# Patient Record
Sex: FEMALE | Race: WHITE | NOT HISPANIC OR LATINO | Employment: FULL TIME | URBAN - METROPOLITAN AREA
[De-identification: names, ages, dates, MRNs, and addresses within clinical notes are randomized per-mention and may not be internally consistent; named-entity substitution may affect disease eponyms.]

---

## 2017-03-07 ENCOUNTER — ALLSCRIPTS OFFICE VISIT (OUTPATIENT)
Dept: OTHER | Facility: OTHER | Age: 22
End: 2017-03-07

## 2017-03-17 ENCOUNTER — GENERIC CONVERSION - ENCOUNTER (OUTPATIENT)
Dept: OTHER | Facility: OTHER | Age: 22
End: 2017-03-17

## 2017-03-17 LAB — COMMENTS: (HISTORICAL): NORMAL

## 2017-05-30 ENCOUNTER — ALLSCRIPTS OFFICE VISIT (OUTPATIENT)
Dept: OTHER | Facility: OTHER | Age: 22
End: 2017-05-30

## 2017-06-01 ENCOUNTER — ALLSCRIPTS OFFICE VISIT (OUTPATIENT)
Dept: OTHER | Facility: OTHER | Age: 22
End: 2017-06-01

## 2017-11-17 ENCOUNTER — GENERIC CONVERSION - ENCOUNTER (OUTPATIENT)
Dept: OTHER | Facility: OTHER | Age: 22
End: 2017-11-17

## 2017-12-12 ENCOUNTER — ALLSCRIPTS OFFICE VISIT (OUTPATIENT)
Dept: OTHER | Facility: OTHER | Age: 22
End: 2017-12-12

## 2017-12-13 NOTE — PROGRESS NOTES
Plan  Contraception    · TriNessa (28) 0 18/0 215/0 25 MG-35 MCG Oral Tablet; Take 1 tablet daily   Rx By: Mehrdad Thibodeaux; Dispense: 0 Days ; #:84 Tablet; Refill: 3;Contraception; BILLY = Y; Sent To: [facility]    Discussion/Summary    Patient of a normal gyn exam  A refill of her birth control was sent to her mail order  She will follow up in one year or sooner with any concerns  The patient has the current Goals: To continue a health life style  The patent has the current Barriers: No barriers  Patient is able to Self-Care  Self Referrals: Yes      Chief Complaint  yearly      History of Present Illness  HPI: This is a 20year old white female, nulliparous  She is currently in a long-term monogamous relationship  Her current method of birth control is oral contraceptive  States her cycles are normal lasting about 4-5 days  She denies any  or GI complaints  She denies any change to her medical or family history  She is currently working for [employer]  She denies any symptoms of anxiety or depression  GYN , Adult Female [facility]: The patient is being seen for a gynecology evaluation  The last health maintenance visit was 1 year(s) ago  General Health: The patient's health since the last visit is described as good  She has regular dental visits  -- She denies vision problems  -- She denies hearing loss  Lifestyle:  She does not have a healthy diet  -- She does not have any weight concerns  -- She exercises regularly  -- She does not use tobacco -- She denies alcohol use  -- She denies drug use  Screening:      Review of Systems   Constitutional: No fever, no chills, feels well, no tiredness, no recent weight gain or loss  ENT: no ear ache, no loss of hearing, no nosebleeds or nasal discharge, no sore throat or hoarseness  Cardiovascular: no complaints of slow or fast heart rate, no chest pain, no palpitations, no leg claudication or lower extremity edema    Respiratory: no complaints of shortness of breath, no wheezing, no dyspnea on exertion, no orthopnea or PND  Breasts: no complaints of breast pain, breast lump or nipple discharge  Gastrointestinal: no complaints of abdominal pain, no constipation, no nausea or diarrhea, no vomiting, no bloody stools  Genitourinary: no complaints of dysuria, no incontinence, no pelvic pain, no dysmenorrhea, no vaginal discharge or abnormal vaginal bleeding  Musculoskeletal: no complaints of arthralgia, no myalgia, no joint swelling or stiffness, no limb pain or swelling  Integumentary: no complaints of skin rash or lesion, no itching or dry skin, no skin wounds  Neurological: no complaints of headache, no confusion, no numbness or tingling, no dizziness or fainting  Over the past 2 weeks, how often have you been bothered by the following problems? 1 ) Little interest or pleasure in doing things? Not at all   2 ) Feeling down, depressed or hopeless? Not at all   3 ) Trouble falling asleep or sleeping too much? Not at all   4 ) Feeling tired or having little energy? Not at all   5 ) Poor appetite or overeating? Not at all   6 ) Feeling bad about yourself, or that you are a failure, or have let yourself or your family down? Not at all   7 ) Trouble concentrating on things, such as reading a newspaper or watching television? Not at all   8 ) Moving or speaking so slowly that other people could have noticed, or the opposite, moving or speaking faster than usual? Not at all  How difficult have these problems made it for you to do your work, take care of things at home, or get along with people? 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)   6  Screening for tuberculosis (V74 1) (Z11 1)    Past Medical History   · History of Acute serous otitis media, unspecified laterality   · History of Acute upper respiratory infection (465 9) (J06 9)   · History of acute pharyngitis (V12 69) (Z87 09)   · History of headache (V13 89) (S47 658)   · History of herpes simplex infection (V12 09) (Z86 19)   · History of Lumbar Sprain (847 2)   · History of Plantar warts (078 12) (B07 0)   · History of Rhinosinusitis (473 9) (J32 9)   · History of Sore throat (462) (J02 9)   · History of Tinea versicolor (111 0) (B36 0)   · History of Tinea versicolor (111 0) (B36 0)   · History of Tonsillopharyngitis (465 8) (J03 90,J02  9)   · History of Ureteric stone (592 1) (N20 1)   · History of Vaginal candidiasis (112 1) (B37 3)    Surgical History   · History of Rhinoplasty   · History of Tonsillectomy With Adenoidectomy    Family History  Father    · Family history of Post-angioplasty  Maternal Grandfather    · Family history of Prostate Cancer (V16 42)  Paternal Grandfather    · Family history of Acute Myocardial Infarction (V17 3)  Family History    · Family history of Diabetes Mellitus (V18 0)   · Family history of Epilepsy And Recurrent Seizures (V17 2)    Social History     · Never A Smoker    Current Meds   1  TriNessa (28) 0 18/0 215/0 25 MG-35 MCG Oral Tablet; Take 1 tablet daily; Therapy: 77Hpg3661 to (Last Rx:30Jan2017)  Requested for: 30Jan2017 Ordered   2  ValACYclovir HCl - 1 GM Oral Tablet; take 2 tab  BID with outbreak; Therapy: 49RHG4433 to (Last Dejon Jarrett)  Requested for: 44Yjf1632 Ordered    Allergies  1  No Known Drug Allergies    Vitals   Recorded: 05JNR1360 39:91PC   Systolic 183   Diastolic 70   Height 5 ft 3 5 in   Weight 169 lb    BMI Calculated 29 47   BSA Calculated 1 81   LMP [id number]       Physical Exam   Constitutional  General appearance: No acute distress, well appearing and well nourished  Neck  Neck: Normal, supple, trachea midline, no masses  Thyroid: Normal, no thyromegaly  Pulmonary  Respiratory effort: No increased work of breathing or signs of respiratory distress  Auscultation of lungs: Clear to auscultation  Cardiovascular  Auscultation of heart: Normal rate and rhythm, normal S1 and S2, no murmurs  Peripheral vascular exam: Normal pulses Throughout  Genitourinary  External genitalia: Normal and no lesions appreciated  Vagina: Normal, no lesions or dryness appreciated  Urethra: Normal    Urethral meatus: Normal    Bladder: Normal, soft, non-tender and no prolapse or masses appreciated  Cervix: Normal, no palpable masses  Uterus: Normal, non-tender, not enlarged, and no palpable masses  Adnexa/parametria: Normal, non-tender and no fullness or masses appreciated  Chest  Breasts: Normal and no dimpling or skin changes noted  Abdomen  Abdomen: Normal, non-tender, and no organomegaly noted  Liver and spleen: No hepatomegaly or splenomegaly  Examination for hernias: No hernias appreciated  Lymphatic  Palpation of lymph nodes in neck, axillae, groin and/or other locations: No lymphadenopathy or masses noted  Skin  Skin and subcutaneous tissue: Normal skin turgor and no rashes     Palpation of skin and subcutaneous tissue: Normal    Psychiatric  Orientation to person, place, and time: Normal    Mood and affect: Normal        Signatures   Electronically signed by : Car Matamoros; Dec 12 2017  1:41PM EST                       (Author)

## 2017-12-31 ENCOUNTER — GENERIC CONVERSION - ENCOUNTER (OUTPATIENT)
Dept: OTHER | Facility: OTHER | Age: 22
End: 2017-12-31

## 2018-01-12 NOTE — PROGRESS NOTES
Assessment    1  Encounter for annual routine gynecological examination (V72 31) (Z01 419)   2  Screening for tuberculosis (V74 1) (Z11 1)    Plan  Screening for tuberculosis    · PPD    Discussion/Summary  health maintenance visit Currently, she eats a poor diet and has an inadequate exercise regimen  the risks and benefits of cervical cancer screening were discussed cervical cancer screening is current cervical cancer screening is managed by GYN, Dr Mario Jasso Breast cancer screening: the risks and benefits of breast cancer screening were discussed, self breast exam technique was taught and monthly self breast exam was advised  Colorectal cancer screening: the risks and benefits of colorectal cancer screening were discussed  Osteoporosis screening: bone mineral density testing is not indicated  The risks and benefits of immunizations were discussed and immunizations are up to date  Advice and education were given regarding nutrition, aerobic exercise, weight bearing exercise, weight loss, reproductive health, contraception, sunscreen use, self skin examination and seat belt use  Work PE forms completed today  PPD administered- will need to be read in 48-72 hours  Will be due for Tdap 12/2017  Encouraged regular exercise, weight loss  Declined routine labs- her insurance will be changing with her new job shortly  RTO as scheduled  Possible side effects of new medications were reviewed with the patient/guardian today  The treatment plan was reviewed with the patient/guardian  The patient/guardian understands and agrees with the treatment plan      Chief Complaint  Patient is here for an employment physical exam with papers  History of Present Illness  HM, Adult Female: The patient is being seen for a health maintenance evaluation  The last health maintenance visit was unsure of last PE  Social History: Household members include mother and Brother  She is unmarried   Work status: working part-time and occupation: [employer]  The patient has never smoked cigarettes  She reports occasional alcohol use and drinking 2-3 drinks per month  She has never used illicit drugs  General Health: The patient's health since the last visit is described as good  She has regular dental visits  The patient reports her last dental visit was 6 months ago  Dental problems: no tooth pain and no caries  She complains of vision problems  Vision care includes wearing soft contact lenses and an eye examination within the last year  She denies hearing loss  Immunizations status: up to date   due for Tdap 12/2017  Lifestyle:  She consumes a diverse and healthy diet  She has weight concerns  Weight control issues: overweight  She does not exercise regularly  She does not use tobacco  She consumes alcohol  She denies drug use  Reproductive health:  she reports normal menses  she uses contraception  For contraception, she uses oral contraception pills  she is sexually active  She is monogamous with a male partner  she denies prior pregnancies  Screening: Cervical cancer screening includes a pap smear performed 3/2017  Breast cancer screening includes no previous mammogram, a clinical breast exam performed 3/2017 and monthly breast self-exams performed  She hasn't been previously screened for colorectal cancer  Metabolic screening includes no previous lipid profile, uncertain timing of her last glucose screening, uncertain timing of her last thyroid function test and no previous DEXA  Cardiovascular risk factors: sedentary lifestyle and family history of cardiovascular disease, but no hypertension, no diabetes, no high LDL cholesterol, no low HDL cholesterol, no stress, no obesity, no tobacco use and no illicit drug use  General health risks: no abnormal cervical cytology and no positive screening for human papilloma virus  Safety elements used: seat belt, sunscreen and smoke detector     HPI: Pt presents by herself today for a routine PE  She will be starting work at [employer] in the upcoming weeks  No acute complaints today, feels well  Review of Systems    Constitutional: no fever, not feeling poorly, no chills and not feeling tired  ENT: no earache, no sore throat, no nasal discharge and no hoarseness  Cardiovascular: no chest pain, no intermittent leg claudication, no palpitations and no lower extremity edema  Respiratory: no shortness of breath, no cough, no wheezing and no shortness of breath during exertion  Gastrointestinal: no abdominal pain, no nausea, no constipation, no diarrhea and no blood in stools  Genitourinary: no dysuria, no incontinence and no unexplained vaginal bleeding  Musculoskeletal: no arthralgias and no myalgias  Integumentary: no rashes and no skin wound  Neurological: no headache and no dizziness  Psychiatric: no anxiety and no depression  no feelings of weakness   Hematologic/Lymphatic: no swollen glands, no tendency for easy bleeding and no tendency for easy bruising  ROS reviewed  Active Problems    1  Encounter for annual routine gynecological examination (V72 31) (Z01 419)   2  Herpes simplex type 1 infection (054 9) (B00 9)   3  Nasal septal deviation (470) (J34 2)   4  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)    Past Medical History    · History of Acute serous otitis media, unspecified laterality   · History of Acute upper respiratory infection (465 9) (J06 9)   · History of acute pharyngitis (V12 69) (Z87 09)   · History of headache (V13 89) (U18 138)   · History of herpes simplex infection (V12 09) (Z86 19)   · History of Lumbar Sprain (847 2)   · History of Plantar warts (078 12) (B07 0)   · History of Rhinosinusitis (473 9) (J32 9)   · History of Sore throat (462) (J02 9)   · History of Tinea versicolor (111 0) (B36 0)   · History of Tinea versicolor (111 0) (B36 0)   · History of Tonsillopharyngitis (465 8) (J03 90,J02  9)   · History of Ureteric stone (592 1) (N20 1)   · History of Vaginal candidiasis (112 1) (B37 3)    Surgical History    · History of Rhinoplasty   · History of Tonsillectomy With Adenoidectomy    Family History  Father    · Family history of Post-angioplasty  Maternal Grandfather    · Family history of Prostate Cancer (V16 42)  Paternal Grandfather    · Family history of Acute Myocardial Infarction (V17 3)  Family History    · Family history of Diabetes Mellitus (V18 0)   · Family history of Epilepsy And Recurrent Seizures (V17 2)    Social History    · Never A Smoker    Current Meds   1  TriNessa (28) 0 18/0 215/0 25 MG-35 MCG Oral Tablet; Take 1 tablet daily; Therapy: 46Jzm7401 to (Last Rx:30Jan2017)  Requested for: 30Jan2017 Ordered   2  ValACYclovir HCl - 1 GM Oral Tablet; take 2 tab  BID with outbreak; Therapy: 21TWF9297 to (Last Rx:07Mar2017)  Requested for: 92MSF1317 Ordered    Allergies    1  No Known Drug Allergies    Vitals   Recorded: 33REM7393 09:13AM   Temperature 97 9 F, Tympanic   Heart Rate 90, R Radial   Respiration 16   Systolic 822, LUE, Sitting   Diastolic 84, LUE, Sitting   Height 5 ft 3 5 in   Weight 164 lb 8 oz   BMI Calculated 28 68   BSA Calculated 1 79     Physical Exam    Constitutional   General appearance: No acute distress, well appearing and well nourished  overweight  Head and Face   Head and face: Normal     Palpation of the face and sinuses: No sinus tenderness  Eyes   Conjunctiva and lids: No swelling, erythema or discharge  Pupils and irises: Equal, round, reactive to light  Ears, Nose, Mouth, and Throat   External inspection of ears and nose: Normal     Otoscopic examination: Tympanic membranes translucent with normal light reflex  Canals patent without erythema  Hearing: Normal     Nasal mucosa, septum, and turbinates: Normal without edema or erythema  Lips, teeth, and gums: Normal, good dentition      Oropharynx: Normal with no erythema, edema, exudate or lesions  Neck   Neck: Supple, symmetric, trachea midline, no masses  Thyroid: Normal, no thyromegaly  Pulmonary   Respiratory effort: No increased work of breathing or signs of respiratory distress  Auscultation of lungs: Clear to auscultation  Cardiovascular   Auscultation of heart: Normal rate and rhythm, normal S1 and S2, no murmurs  Carotid pulses: 2+ bilaterally  Examination of extremities for edema and/or varicosities: Normal     Abdomen   Abdomen: Non-tender, no masses  The abdomen was rounded  Bowel sounds were normal  The abdomen was soft and nontender  Liver and spleen: No hepatomegaly or splenomegaly  Lymphatic   Palpation of lymph nodes in neck: No lymphadenopathy  Musculoskeletal   Gait and station: Normal     Digits and nails: Normal without clubbing or cyanosis  Joints, bones, and muscles: Normal     Skin   Skin and subcutaneous tissue: Normal without rashes or lesions  Palpation of skin and subcutaneous tissue: Normal turgor  Neurologic   Cranial nerves: Cranial nerves II-XII intact  Cortical function: Normal mental status  Psychiatric   Judgment and insight: Normal     Orientation to person, place, and time: Normal     Mood and affect: Normal        Attending Note  Collaborating Physician Note: Collaborating Physician: I did not interview and examine the patient and I agree with the Advanced Practitioner note  Future Appointments    Date/Time Provider Specialty Site   06/01/2017 10:00 AM Nurse Whit Childress  [facility]     Signatures   Electronically signed by : QUYEN Barney; May 30 2017  9:55AM EST                       (Author)    Electronically signed by :  Gabriela Coe MD; May 30 2017 12:00PM EST                       (Author)

## 2018-01-13 VITALS
HEIGHT: 64 IN | TEMPERATURE: 97.9 F | SYSTOLIC BLOOD PRESSURE: 116 MMHG | DIASTOLIC BLOOD PRESSURE: 84 MMHG | RESPIRATION RATE: 16 BRPM | WEIGHT: 164.5 LBS | HEART RATE: 90 BPM | BODY MASS INDEX: 28.09 KG/M2

## 2018-01-15 VITALS
HEIGHT: 64 IN | BODY MASS INDEX: 29.03 KG/M2 | HEART RATE: 88 BPM | DIASTOLIC BLOOD PRESSURE: 90 MMHG | SYSTOLIC BLOOD PRESSURE: 130 MMHG | TEMPERATURE: 98.8 F | WEIGHT: 170.03 LBS | RESPIRATION RATE: 16 BRPM

## 2018-01-16 NOTE — PROGRESS NOTES
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

## 2018-01-22 VITALS
DIASTOLIC BLOOD PRESSURE: 70 MMHG | RESPIRATION RATE: 12 BRPM | HEIGHT: 64 IN | HEART RATE: 72 BPM | WEIGHT: 169.4 LBS | SYSTOLIC BLOOD PRESSURE: 118 MMHG | BODY MASS INDEX: 28.92 KG/M2 | TEMPERATURE: 97.8 F

## 2018-01-23 VITALS
HEIGHT: 64 IN | SYSTOLIC BLOOD PRESSURE: 112 MMHG | WEIGHT: 169 LBS | DIASTOLIC BLOOD PRESSURE: 70 MMHG | BODY MASS INDEX: 28.85 KG/M2

## 2018-01-23 NOTE — MISCELLANEOUS
Message  Message Free Text Note Form: Pt called answering service for UTI symptoms  I tried to call pt back but nobody answer  I left message  Signatures   Electronically signed by :  Crispin Addison MD; Dec 31 2017 12:41PM EST                       (Author)

## 2018-01-23 NOTE — MISCELLANEOUS
Message  Message Free Text Note Form: Pt called back  Pt states she feels urinary burning and frequency for 2 days  Denies fever, flank pain, SOB, CP, nausea, vomiting etc  I will send cipro to her pharmacy  SE educated pt  I told pt if symptoms no improving, need to be seen  Pt agreed  Plan    1  Ciprofloxacin HCl - 250 MG Oral Tablet; TAKE 1 TABLET EVERY 12 HOURS DAILY    Signatures   Electronically signed by :  Norm Waters MD; Dec 31 2017  1:06PM EST                       (Author)

## 2018-01-30 DIAGNOSIS — B00.1 RECURRENT HERPES LABIALIS: Primary | ICD-10-CM

## 2018-01-30 RX ORDER — ACYCLOVIR 400 MG/1
400 TABLET ORAL 2 TIMES DAILY PRN
Refills: 0 | OUTPATIENT
Start: 2018-01-30

## 2018-01-30 RX ORDER — VALACYCLOVIR HYDROCHLORIDE 1 G/1
1000 TABLET, FILM COATED ORAL 2 TIMES DAILY
Qty: 12 TABLET | Refills: 1 | Status: SHIPPED | OUTPATIENT
Start: 2018-01-30 | End: 2018-10-29 | Stop reason: SDUPTHER

## 2018-08-24 ENCOUNTER — TELEPHONE (OUTPATIENT)
Dept: OBGYN CLINIC | Facility: CLINIC | Age: 23
End: 2018-08-24

## 2018-08-24 DIAGNOSIS — B37.9 YEAST INFECTION: Primary | ICD-10-CM

## 2018-08-24 RX ORDER — FLUCONAZOLE 150 MG/1
150 TABLET ORAL ONCE
Qty: 1 TABLET | Refills: 0 | Status: SHIPPED | OUTPATIENT
Start: 2018-08-24 | End: 2018-08-24

## 2018-08-24 RX ORDER — FLUCONAZOLE 150 MG/1
150 TABLET ORAL ONCE
Qty: 1 TABLET | Refills: 0 | Status: CANCELLED | OUTPATIENT
Start: 2018-08-24 | End: 2018-08-24

## 2018-08-24 NOTE — TELEPHONE ENCOUNTER
Pt would like a script called in for the pill to treat yeast infection, does not want to use the cream cause just got her period  Please call [phone]

## 2018-10-29 ENCOUNTER — OFFICE VISIT (OUTPATIENT)
Dept: FAMILY MEDICINE CLINIC | Facility: CLINIC | Age: 23
End: 2018-10-29
Payer: COMMERCIAL

## 2018-10-29 VITALS
SYSTOLIC BLOOD PRESSURE: 108 MMHG | RESPIRATION RATE: 16 BRPM | BODY MASS INDEX: 29.99 KG/M2 | OXYGEN SATURATION: 97 % | DIASTOLIC BLOOD PRESSURE: 76 MMHG | TEMPERATURE: 98.2 F | WEIGHT: 172 LBS | HEART RATE: 90 BPM

## 2018-10-29 DIAGNOSIS — H69.81 EUSTACHIAN TUBE DYSFUNCTION, RIGHT: ICD-10-CM

## 2018-10-29 DIAGNOSIS — J06.9 ACUTE UPPER RESPIRATORY INFECTION: Primary | ICD-10-CM

## 2018-10-29 DIAGNOSIS — B00.1 RECURRENT HERPES LABIALIS: ICD-10-CM

## 2018-10-29 PROBLEM — H69.91 EUSTACHIAN TUBE DYSFUNCTION, RIGHT: Status: ACTIVE | Noted: 2018-10-29

## 2018-10-29 PROCEDURE — 1036F TOBACCO NON-USER: CPT | Performed by: FAMILY MEDICINE

## 2018-10-29 PROCEDURE — 99214 OFFICE O/P EST MOD 30 MIN: CPT | Performed by: FAMILY MEDICINE

## 2018-10-29 RX ORDER — METHYLPREDNISOLONE 4 MG/1
TABLET ORAL
Qty: 21 TABLET | Refills: 0 | Status: SHIPPED | OUTPATIENT
Start: 2018-10-29 | End: 2018-12-18 | Stop reason: ALTCHOICE

## 2018-10-29 RX ORDER — VALACYCLOVIR HYDROCHLORIDE 1 G/1
1000 TABLET, FILM COATED ORAL 2 TIMES DAILY
Qty: 12 TABLET | Refills: 0 | Status: SHIPPED | OUTPATIENT
Start: 2018-10-29 | End: 2018-12-18 | Stop reason: SDUPTHER

## 2018-10-29 RX ORDER — NORGESTIMATE AND ETHINYL ESTRADIOL 7DAYSX3 28
1 KIT ORAL DAILY
COMMUNITY
Start: 2015-08-28 | End: 2018-11-13 | Stop reason: SDUPTHER

## 2018-10-29 RX ORDER — AMOXICILLIN 875 MG/1
875 TABLET, COATED ORAL 2 TIMES DAILY
Qty: 20 TABLET | Refills: 0 | Status: SHIPPED | OUTPATIENT
Start: 2018-10-29 | End: 2018-11-08

## 2018-10-29 NOTE — PROGRESS NOTES
Chief Complaint   Patient presents with    Sore Throat     Congestion     Health Maintenance   Topic Date Due    DTaP,Tdap,and Td Vaccines (6 - Td) 12/27/2017    INFLUENZA VACCINE  07/01/2018    Depression Screening PHQ  10/29/2019     Assessment/Plan:    Acute upper respiratory infection  Will start Amoxil 875 mg twice daily  Recommended to increase fluid intake, take Tylenol Cold PRN  Patient refusing using steroid nasal spray  Call office if symptoms persist or worsen  Eustachian tube dysfunction, right  Recommended to start Medrol dosepak  Referred to ENT for further evaluation  Recurrent herpes labialis  Rx refilled for Valtrex 1000 mg to take 2 tab  twice daily for 1 day for cold sores outbreak  Encounter Diagnoses   Name Primary?  Acute upper respiratory infection Yes    Eustachian tube dysfunction, right     Recurrent herpes labialis        Orders Placed This Encounter   Procedures    Ambulatory Referral to Otolaryngology      There are no diagnoses linked to this encounter  I have spent 25 minutes with Patient  today in which greater than 50% of this time was spent in counseling/coordination of care regarding Prognosis, Risks and benefits of tx options, Intructions for management, Patient and family education, Importance of tx compliance, Risk factor reductions and Impressions  Subjective:      Patient ID: Robert Corea is a 24 y o  female  HPI     Patient presents to the office c/o nasal congestion, sinus pressure,  sore throat, mild dry cough for the last 5- 6 days  She took Sudafed with no relief in symptoms  Denies fever, chills  She works with children  C/o having R ear discomfort when lying on R side,  muffled sound for the past few months  No H/o recurrent ear infections  Patient states that she does not like to use nasal sprays  Denies tobacco use  C/o recurrent cold sores  Requesting refill for Valtrex       The following portions of the patient's history were reviewed and updated as appropriate: allergies, current medications, past medical history, past social history, past surgical history and problem list     Review of Systems   Constitutional: Positive for fatigue (feeling tired)  Negative for activity change, appetite change, chills and fever  HENT: Positive for congestion, sinus pressure and sore throat  Negative for ear discharge, facial swelling, hearing loss, nosebleeds, tinnitus and trouble swallowing  R ear discomfort, muffled sound in R ear   Eyes: Negative for pain, discharge, redness, itching and visual disturbance  Respiratory: Positive for cough (mild dry cough)  Negative for chest tightness, shortness of breath and wheezing  Cardiovascular: Negative for chest pain, palpitations and leg swelling  Gastrointestinal: Negative  Genitourinary: Negative  Musculoskeletal: Negative for arthralgias, joint swelling and myalgias  Skin: Negative for rash and wound  Neurological: Positive for headaches  Negative for dizziness  Hematological: Negative  Objective:      /76 (BP Location: Left arm, Patient Position: Sitting, Cuff Size: Adult)   Pulse 90   Temp 98 2 °F (36 8 °C) (Oral)   Resp 16   Wt 78 kg (172 lb)   SpO2 97%   BMI 29 99 kg/m²          Physical Exam   Constitutional: She appears well-developed and well-nourished  HENT:   Head: Normocephalic and atraumatic  Right Ear: External ear normal    Left Ear: External ear normal    Pharynx: erythematous  No exudate  Nasal mucosa is swollen, red  Eyes: Pupils are equal, round, and reactive to light  Conjunctivae are normal    Neck: Normal range of motion  Neck supple  Cardiovascular: Normal rate, regular rhythm and normal heart sounds  No murmur heard  Pulmonary/Chest: Effort normal and breath sounds normal  She has no wheezes  She has no rales  Abdominal: Soft  Bowel sounds are normal  There is no tenderness  Musculoskeletal: Normal range of motion  She exhibits no edema, tenderness or deformity  Lymphadenopathy:     She has no cervical adenopathy  Skin: Skin is warm and dry  No rash noted  Nursing note and vitals reviewed

## 2018-10-30 NOTE — ASSESSMENT & PLAN NOTE
Will start Amoxil 875 mg twice daily  Recommended to increase fluid intake, take Tylenol Cold PRN  Patient refusing using steroid nasal spray  Call office if symptoms persist or worsen

## 2018-11-13 DIAGNOSIS — Z30.41 ENCOUNTER FOR SURVEILLANCE OF CONTRACEPTIVE PILLS: Primary | ICD-10-CM

## 2018-11-13 RX ORDER — NORGESTIMATE-ETHINYL ESTRADIOL 7DAYSX3 28
TABLET ORAL
Qty: 84 TABLET | Refills: 3 | Status: SHIPPED | OUTPATIENT
Start: 2018-11-13 | End: 2018-12-18 | Stop reason: SDUPTHER

## 2018-12-18 ENCOUNTER — ANNUAL EXAM (OUTPATIENT)
Dept: OBGYN CLINIC | Facility: CLINIC | Age: 23
End: 2018-12-18
Payer: COMMERCIAL

## 2018-12-18 VITALS
WEIGHT: 178 LBS | SYSTOLIC BLOOD PRESSURE: 112 MMHG | BODY MASS INDEX: 30.39 KG/M2 | HEIGHT: 64 IN | DIASTOLIC BLOOD PRESSURE: 72 MMHG

## 2018-12-18 DIAGNOSIS — Z01.419 WOMEN'S ANNUAL ROUTINE GYNECOLOGICAL EXAMINATION: Primary | ICD-10-CM

## 2018-12-18 DIAGNOSIS — Z30.41 ENCOUNTER FOR SURVEILLANCE OF CONTRACEPTIVE PILLS: ICD-10-CM

## 2018-12-18 DIAGNOSIS — B00.1 RECURRENT HERPES LABIALIS: ICD-10-CM

## 2018-12-18 PROBLEM — J06.9 ACUTE UPPER RESPIRATORY INFECTION: Status: RESOLVED | Noted: 2018-10-29 | Resolved: 2018-12-18

## 2018-12-18 PROBLEM — H69.91 EUSTACHIAN TUBE DYSFUNCTION, RIGHT: Status: RESOLVED | Noted: 2018-10-29 | Resolved: 2018-12-18

## 2018-12-18 PROBLEM — H69.81 EUSTACHIAN TUBE DYSFUNCTION, RIGHT: Status: RESOLVED | Noted: 2018-10-29 | Resolved: 2018-12-18

## 2018-12-18 PROCEDURE — 99395 PREV VISIT EST AGE 18-39: CPT | Performed by: NURSE PRACTITIONER

## 2018-12-18 RX ORDER — NORGESTIMATE-ETHINYL ESTRADIOL 7DAYSX3 28
1 TABLET ORAL DAILY
Qty: 84 TABLET | Refills: 3 | Status: SHIPPED | OUTPATIENT
Start: 2018-12-18 | End: 2019-12-09 | Stop reason: SDUPTHER

## 2018-12-18 RX ORDER — VALACYCLOVIR HYDROCHLORIDE 1 G/1
1000 TABLET, FILM COATED ORAL 2 TIMES DAILY
Qty: 12 TABLET | Refills: 1 | Status: SHIPPED | OUTPATIENT
Start: 2018-12-18 | End: 2019-12-12 | Stop reason: SDUPTHER

## 2018-12-18 NOTE — PROGRESS NOTES
Subjective  HPI:     Saleem Davis is a 24 y o  female  She is nulliparous, in a monogamous relationship  Her menstrual cycles are regular and predictable, light lasting 4-5 days  Her current method of contraception includes oral OCP, needs refills  She denies issues with intimacy  She denies /GI and Gyn complaints  She denies depression/anxiety  Hx of recurrent herpes labialis, needs script for valtrex  There are no changes to her medical, surgical or family hx  Her dental care is up-to-date  Gynecologic History    Patient's last menstrual period was 2018  Gardasil Vaccine Series: refuses vaccines at this time  Last Pap: 3/17/17  Results were: normal      Obstetric History    OB History    Para Term  AB Living   0 0 0 0 0 0   SAB TAB Ectopic Multiple Live Births   0 0 0 0 0             The following portions of the patient's history were reviewed and updated as appropriate: allergies, current medications, past family history, past medical history, past social history, past surgical history and problem list     Review of Systems    Pertinent items are noted in HPI  Objective    Physical Exam   Constitutional: Vital signs are normal  She appears well-developed and well-nourished  Genitourinary: Vagina normal and uterus normal  Pelvic exam was performed with patient supine  There is no rash, tenderness, lesion or Bartholin's cyst on the right labia  There is no rash, tenderness, lesion or Bartholin's cyst on the left labia  Right adnexum does not display mass, does not display tenderness and does not display fullness  Left adnexum does not display mass, does not display tenderness and does not display fullness  Cervix is nulliparous  Cervix does not exhibit motion tenderness, lesion, discharge, friability, polyp or nabothian cyst      Uterus is anteverted  HENT:   Head: Normocephalic and atraumatic  Neck: Neck supple  No thyromegaly present     Cardiovascular: Normal rate, regular rhythm, S1 normal, S2 normal and normal heart sounds  Pulmonary/Chest: Effort normal and breath sounds normal  Right breast exhibits no inverted nipple, no mass, no nipple discharge, no skin change and no tenderness  Left breast exhibits no inverted nipple, no mass, no nipple discharge, no skin change and no tenderness  Abdominal: Soft  Bowel sounds are normal  She exhibits no distension and no mass  There is no tenderness  There is no guarding  Lymphadenopathy:     She has no cervical adenopathy  She has no axillary adenopathy  Neurological: She is alert  Skin: Skin is warm  Psychiatric: She has a normal mood and affect  Nursing note and vitals reviewed  Assessment and Plan    Sheila Martin was seen today for gynecologic exam     Diagnoses and all orders for this visit:    Women's annual routine gynecological examination  -     Pap Lb (Liquid-based)    Encounter for surveillance of contraceptive pills  -     TRINESSA, 28, 0 18/0 215/0 25 MG-35 MCG per tablet; Take 1 tablet by mouth daily    Recurrent herpes labialis  -     valACYclovir (VALTREX) 1,000 mg tablet; Take 1 tablet (1,000 mg total) by mouth 2 (two) times a day for 1 day      Patient informed of a Stable GYN exam  A pap smear was performed as well STD screening  Refills of her OCP and valtrex issued  Verbal consent given by patient to leave detailed message of results on her cell phone VM  Contraception: OCP (estrogen/progesterone)  Follow up in: 1 year

## 2018-12-24 LAB
A VAGINAE DNA VAG NAA+PROBE-LOG#: NOT DETECTED LOG (CELLS/ML)
C GLABRATA DNA VAG QL NAA+PROBE: NOT DETECTED
C TRACH RRNA SPEC QL NAA+PROBE: NOT DETECTED
CANDIDA DNA VAG QL NAA+PROBE: NOT DETECTED
CLINICAL INFO: NORMAL
CYTO CVX: NORMAL
CYTOLOGY CMNT CVX/VAG CYTO-IMP: NORMAL
DATE PREVIOUS BX: NORMAL
G VAGINALIS DNA VAG NAA+PROBE-LOG#: 6.1 LOG (CELLS/ML)
LACTOBACILLUS DNA VAG NAA+PROBE-LOG#: 6.4 LOG (CELLS/ML)
LMP START DATE: NORMAL
MEGASPHAERA SP DNA VAG NAA+PROBE-LOG#: NOT DETECTED LOG (CELLS/ML)
N GONORRHOEA RRNA SPEC QL NAA+PROBE: NOT DETECTED
SL AMB BV CATEGORY:: ABNORMAL
SL AMB C. PARAPSILOSIS, DNA: NOT DETECTED
SL AMB C. TROPICALIS, DNA: NOT DETECTED
SL AMB PREV. PAP:: NORMAL
SPECIMEN SOURCE CVX/VAG CYTO: NORMAL
T VAGINALIS RRNA SPEC QL NAA+PROBE: NOT DETECTED

## 2018-12-26 ENCOUNTER — TELEPHONE (OUTPATIENT)
Dept: OBGYN CLINIC | Facility: CLINIC | Age: 23
End: 2018-12-26

## 2018-12-26 DIAGNOSIS — B96.89 BACTERIAL VAGINOSIS: Primary | ICD-10-CM

## 2018-12-26 DIAGNOSIS — N76.0 BACTERIAL VAGINOSIS: Primary | ICD-10-CM

## 2018-12-26 RX ORDER — METRONIDAZOLE 500 MG/1
500 TABLET ORAL EVERY 12 HOURS SCHEDULED
Qty: 14 TABLET | Refills: 0 | Status: SHIPPED | OUTPATIENT
Start: 2018-12-26 | End: 2019-01-02

## 2018-12-26 NOTE — TELEPHONE ENCOUNTER
Per patient consent message left on VM of culture results and the need for treatment for BV with Flagyl 500 mg BID  Script sent to pharmacy on file

## 2019-01-25 ENCOUNTER — TELEPHONE (OUTPATIENT)
Dept: OBGYN CLINIC | Facility: CLINIC | Age: 24
End: 2019-01-25

## 2019-01-25 NOTE — TELEPHONE ENCOUNTER
Patient is questioning about getting a different generic name of birth control then use to  Wants to ask questions about it and reactions  Explained to her that you did refill the Trinessa, but that pharmacy's can substitute generics for other generics as long as does not say brand necessary    Please call [phone]

## 2019-07-10 ENCOUNTER — TELEPHONE (OUTPATIENT)
Dept: FAMILY MEDICINE CLINIC | Facility: CLINIC | Age: 24
End: 2019-07-10

## 2019-07-10 ENCOUNTER — OFFICE VISIT (OUTPATIENT)
Dept: FAMILY MEDICINE CLINIC | Facility: CLINIC | Age: 24
End: 2019-07-10
Payer: COMMERCIAL

## 2019-07-10 VITALS
DIASTOLIC BLOOD PRESSURE: 72 MMHG | HEIGHT: 64 IN | WEIGHT: 172 LBS | RESPIRATION RATE: 16 BRPM | OXYGEN SATURATION: 97 % | BODY MASS INDEX: 29.37 KG/M2 | SYSTOLIC BLOOD PRESSURE: 110 MMHG | TEMPERATURE: 99.1 F | HEART RATE: 104 BPM

## 2019-07-10 DIAGNOSIS — J02.9 ACUTE PHARYNGITIS, UNSPECIFIED ETIOLOGY: Primary | ICD-10-CM

## 2019-07-10 PROCEDURE — 99213 OFFICE O/P EST LOW 20 MIN: CPT | Performed by: FAMILY MEDICINE

## 2019-07-10 PROCEDURE — 3008F BODY MASS INDEX DOCD: CPT | Performed by: FAMILY MEDICINE

## 2019-07-10 PROCEDURE — 1036F TOBACCO NON-USER: CPT | Performed by: FAMILY MEDICINE

## 2019-07-10 RX ORDER — AZITHROMYCIN 250 MG/1
TABLET, FILM COATED ORAL
Qty: 6 TABLET | Refills: 0 | Status: SHIPPED | OUTPATIENT
Start: 2019-07-10 | End: 2019-07-14

## 2019-07-10 NOTE — TELEPHONE ENCOUNTER
I called patient and gave the recommendations to her  She actually did set an appointment up with us for today

## 2019-07-10 NOTE — PROGRESS NOTES
Chief Complaint   Patient presents with    Sore Throat     Fever     Health Maintenance   Topic Date Due    BMI: Followup Plan  01/18/2012    DTaP,Tdap,and Td Vaccines (6 - Td) 12/27/2017    INFLUENZA VACCINE  09/10/2019 (Originally 7/1/2019)    Depression Screening PHQ  10/29/2019    BMI: Adult  12/18/2019    PAP SMEAR  12/18/2021    Pneumococcal Vaccine: 65+ Years (1 of 2 - PCV13) 01/18/2059    HEPATITIS B VACCINES  Completed    Pneumococcal Vaccine: Pediatrics (0 to 5 Years) and At-Risk Patients (6 to 59 Years)  Aged Out     BMI Counseling: Body mass index is 29 52 kg/m²  Discussed the patient's BMI with her  The BMI is above average  BMI counseling and education was provided to the patient  Nutrition recommendations include reducing portion sizes, decreasing overall calorie intake, 3-5 servings of fruits/vegetables daily, reducing fast food intake, consuming healthier snacks, decreasing soda and/or juice intake, moderation in carbohydrate intake, increasing intake of lean protein, reducing intake of saturated fat and trans fat and reducing intake of cholesterol  Exercise recommendations include moderate aerobic physical activity for 150 minutes/week  Assessment/Plan:    Acute pharyngitis  Will check throat culture to r/o strep  Start Zithromax for 5 days  Recommended to increase fluid intake  Gargle with warm, salt water, use throat lozenges  Take Tylenol or Advil PRN for fever, headache  Recommended to call office if symptoms persist or worsen  Diagnoses and all orders for this visit:    Acute pharyngitis, unspecified etiology  -     Throat culture  -     azithromycin (ZITHROMAX) 250 mg tablet; Take 2 tablets 1 st day, then 1 tablet daily for 4 days, then stop          Subjective:      Patient ID: Irina Cotton is a 21 y o  female  HPI     Patient presents to the office c/o sore throat, fever, headache for the last 3- 4 days  She denies cough, nasal congestion  Denies ill contacts  No recent travel  She is leaving for vacation tomorrow  Patient takes BCP  Menstrual cycles are regular  Patient states that Amoxil usually does not work for her  No allergy to antibiotics  The following portions of the patient's history were reviewed and updated as appropriate: allergies, current medications, past medical history, past social history, past surgical history and problem list     Review of Systems   Constitutional: Positive for fever  Negative for activity change, appetite change, chills and fatigue  HENT: Positive for sore throat  Negative for congestion, ear pain, mouth sores, nosebleeds, postnasal drip and trouble swallowing  Eyes: Negative for pain, discharge, redness, itching and visual disturbance  Respiratory: Negative for cough, chest tightness, shortness of breath and wheezing  Cardiovascular: Negative for chest pain, palpitations and leg swelling  Gastrointestinal: Positive for nausea (mild)  Negative for constipation, diarrhea and vomiting  Genitourinary: Negative for difficulty urinating, dysuria, flank pain, frequency, hematuria and pelvic pain  Musculoskeletal: Negative for arthralgias, back pain, joint swelling and neck pain  Skin: Negative for rash and wound  Neurological: Positive for headaches  Negative for dizziness  Hematological: Negative  Psychiatric/Behavioral: Negative  Objective:      /72 (BP Location: Left arm, Patient Position: Sitting, Cuff Size: Adult)   Pulse 104   Temp 99 1 °F (37 3 °C) (Tympanic)   Resp 16   Ht 5' 4" (1 626 m)   Wt 78 kg (172 lb)   SpO2 97%   BMI 29 52 kg/m²        Physical Exam   Constitutional: She appears well-developed and well-nourished  HENT:   Head: Normocephalic and atraumatic     Right Ear: Hearing, tympanic membrane and ear canal normal    Left Ear: Hearing, tympanic membrane and ear canal normal    Mouth/Throat: Mucous membranes are normal  Posterior oropharyngeal erythema present  No oropharyngeal exudate  Eyes: Pupils are equal, round, and reactive to light  Neck: Normal range of motion  Neck supple  Cardiovascular: Normal rate and normal heart sounds  Pulmonary/Chest: Effort normal and breath sounds normal    Abdominal: Soft  Bowel sounds are normal  There is no tenderness  Lymphadenopathy:     She has no cervical adenopathy  Skin: Skin is warm and dry  No rash noted  Psychiatric: She has a normal mood and affect  Nursing note and vitals reviewed

## 2019-07-10 NOTE — TELEPHONE ENCOUNTER
We received a call this morning from patient, complaining of a sore throat that started yesterday  Prior to that, she had a fever and chills for 2 days  She said her tonsils have been removed, so it is not likely for her to have strep throat, but she was asking if there was an OTC recommendation for medication to take for the symptoms  She also mentioned having Amoxil on hand and wanted to know if she can take that  I explained that we cannot give that type of advice over the phone, as she needs to be evaluated for proper treatment, and since I am a Registered Medical Assistant, I cannot advise on medication for her to take  I said we have visits available or she can head to a local urgent care location for evaluation, but she said she is going on vacation tomorrow so she didn't want to be seen

## 2019-07-10 NOTE — ASSESSMENT & PLAN NOTE
Will check throat culture to r/o strep  Start Zithromax for 5 days  Recommended to increase fluid intake  Gargle with warm, salt water, use throat lozenges  Take Tylenol or Advil PRN for fever, headache  Recommended to call office if symptoms persist or worsen

## 2019-07-10 NOTE — TELEPHONE ENCOUNTER
Please call patient  Recommend to gargle with warm salt water,use throat lozenges, take Tylenol or Advil PRN for fever  Advised to stay well hydrated  If continues with sore throat, fever she may go to urgent care center at the place that she goes for vacation

## 2019-12-09 ENCOUNTER — ANNUAL EXAM (OUTPATIENT)
Dept: OBGYN CLINIC | Facility: CLINIC | Age: 24
End: 2019-12-09
Payer: COMMERCIAL

## 2019-12-09 VITALS
BODY MASS INDEX: 31.24 KG/M2 | SYSTOLIC BLOOD PRESSURE: 112 MMHG | DIASTOLIC BLOOD PRESSURE: 70 MMHG | HEIGHT: 64 IN | WEIGHT: 183 LBS

## 2019-12-09 DIAGNOSIS — Z01.419 WOMEN'S ANNUAL ROUTINE GYNECOLOGICAL EXAMINATION: Primary | ICD-10-CM

## 2019-12-09 DIAGNOSIS — Z11.3 SCREENING FOR STD (SEXUALLY TRANSMITTED DISEASE): ICD-10-CM

## 2019-12-09 DIAGNOSIS — Z30.41 ENCOUNTER FOR SURVEILLANCE OF CONTRACEPTIVE PILLS: ICD-10-CM

## 2019-12-09 PROCEDURE — 99395 PREV VISIT EST AGE 18-39: CPT | Performed by: NURSE PRACTITIONER

## 2019-12-09 RX ORDER — NORGESTIMATE-ETHINYL ESTRADIOL 7DAYSX3 28
1 TABLET ORAL DAILY
Qty: 84 TABLET | Refills: 3 | Status: SHIPPED | OUTPATIENT
Start: 2019-12-09 | End: 2019-12-18 | Stop reason: SDUPTHER

## 2019-12-09 NOTE — PROGRESS NOTES
Subjective    HPI:     Shweta Grimaldo is a 21 y o  nulliparous female, in a monogamous relationship  Her menstrual cycles are regular and predictable  Her current method of contraception includes OCP  [facility] her Lexie So to another brand  She has noticed this new brand is causing her mood swings 1-2 weeks prior to her cycles  She denies issues with intimacy  She denies /GI and Gyn complaints  She denies depression/anxiety  Medical, surgical and family history reviewed  Her dental care is up-to-date  She eats a healthy diet and exercises regularly  She is not happy with her weight  Gynecologic History    Patient's last menstrual period was 2019  Gardasil Vaccine Series: refuses  Last Pap: 3/17/17  Results were: normal      Obstetric History    OB History    Para Term  AB Living   0 0 0 0 0 0   SAB TAB Ectopic Multiple Live Births   0 0 0 0 0       The following portions of the patient's history were reviewed and updated as appropriate: allergies, current medications, past family history, past medical history, past social history, past surgical history and problem list     Review of Systems    Pertinent items are noted in HPI  Objective    Physical Exam   Constitutional: Vital signs are normal  She appears well-developed and well-nourished  Genitourinary: Vagina normal and uterus normal  Pelvic exam was performed with patient supine  There is no rash, tenderness, lesion or Bartholin's cyst on the right labia  There is no rash, tenderness, lesion or Bartholin's cyst on the left labia  Right adnexum does not display mass, does not display tenderness and does not display fullness  Left adnexum does not display mass, does not display tenderness and does not display fullness  Cervix is nulliparous  Cervix does not exhibit motion tenderness, lesion, discharge, friability, polyp or nabothian cyst      Uterus is anteverted     HENT:   Head: Normocephalic and atraumatic  Neck: Neck supple  No thyromegaly present  Cardiovascular: Normal rate, regular rhythm, S1 normal, S2 normal and normal heart sounds  Pulmonary/Chest: Effort normal and breath sounds normal  Right breast exhibits no inverted nipple, no mass, no nipple discharge, no skin change and no tenderness  Left breast exhibits no inverted nipple, no mass, no nipple discharge, no skin change and no tenderness  Abdominal: Soft  Bowel sounds are normal  She exhibits no distension and no mass  There is no tenderness  There is no guarding  Lymphadenopathy:     She has no cervical adenopathy  She has no axillary adenopathy  Neurological: She is alert  Skin: Skin is warm  Psychiatric: She has a normal mood and affect  Nursing note and vitals reviewed  Assessment and Plan    Janneth Drew was seen today for gynecologic exam and mood swings  Diagnoses and all orders for this visit:    Women's annual routine gynecological examination  -     Cancel: Thinprep Tis Pap Reflex HPV mRNA E6/E7; Future  -     Thinprep Tis Pap Reflex HPV mRNA E6/E7, Chlamydia/N gonorrhoeae    Encounter for surveillance of contraceptive pills  -     TRINESSA, 28, 0 18/0 215/0 25 MG-35 MCG per tablet; Take 1 tablet by mouth daily    Screening for STD (sexually transmitted disease)  -     Thinprep Tis Pap Reflex HPV mRNA E6/E7, Chlamydia/N gonorrhoeae      Patient informed of a Stable GYN exam  A pap smear was performed  I have discussed the importance of exercise and healthy diet as well as adequate intake of calcium and vitamin D  The current ASCCP guidelines were reviewed  The low risk patient will receive pap smear screening every 3 years until the age of 34 and then every 3 to 5 years with HPV co-testing from the ages of 33-67  I emphasized the importance of an annual pelvic and breast exam  A yearly mammogram is recommended for breast cancer screening starting at age 36   All questions have been answered to her satisfaction  OCP refilled and Brand necessary indicated  She will let me know if she receives the [facility] and [place]  Contraception: OCP (estrogen/progesterone)  Follow up in: 1 year

## 2019-12-11 LAB
C TRACH RRNA SPEC QL NAA+PROBE: NOT DETECTED
CLINICAL INFO: NORMAL
CYTO CVX: NORMAL
CYTOLOGY CMNT CVX/VAG CYTO-IMP: NORMAL
DATE PREVIOUS BX: NORMAL
LMP START DATE: NORMAL
N GONORRHOEA RRNA SPEC QL NAA+PROBE: NOT DETECTED
SL AMB PREV. PAP:: NORMAL
SPECIMEN SOURCE CVX/VAG CYTO: NORMAL

## 2019-12-12 ENCOUNTER — TELEPHONE (OUTPATIENT)
Dept: OBGYN CLINIC | Facility: CLINIC | Age: 24
End: 2019-12-12

## 2019-12-12 DIAGNOSIS — B00.1 RECURRENT HERPES LABIALIS: ICD-10-CM

## 2019-12-12 RX ORDER — VALACYCLOVIR HYDROCHLORIDE 1 G/1
TABLET, FILM COATED ORAL
Qty: 12 TABLET | Refills: 3 | Status: SHIPPED | OUTPATIENT
Start: 2019-12-12 | End: 2021-01-19 | Stop reason: SDUPTHER

## 2019-12-12 NOTE — TELEPHONE ENCOUNTER
----- Message from Petrona Warner, [address] sent at 12/12/2019  1:06 PM EST -----  Please call patient with negative STD results and normal pap

## 2019-12-17 DIAGNOSIS — N76.0 ACUTE VAGINITIS: Primary | ICD-10-CM

## 2019-12-17 RX ORDER — FLUCONAZOLE 150 MG/1
150 TABLET ORAL ONCE
Qty: 1 TABLET | Refills: 0 | Status: SHIPPED | OUTPATIENT
Start: 2019-12-17 | End: 2019-12-17

## 2019-12-17 NOTE — TELEPHONE ENCOUNTER
rec'd rf req for Jayme Rodriguez (pt was taking specific generic due to mood swings on other generics) - was told by Althea Schirmer no longer available - she was given [employer]  She will try this generic & f/u if side effects  She is also req presc for Diflucan    [facility] on [facility]c [facility]

## 2019-12-18 DIAGNOSIS — Z30.41 ENCOUNTER FOR SURVEILLANCE OF CONTRACEPTIVE PILLS: ICD-10-CM

## 2019-12-18 RX ORDER — NORGESTIMATE AND ETHINYL ESTRADIOL 7DAYSX3 28
KIT ORAL
Qty: 84 TABLET | Refills: 4 | Status: SHIPPED | OUTPATIENT
Start: 2019-12-18 | End: 2020-12-01 | Stop reason: DRUGHIGH

## 2020-02-03 ENCOUNTER — TELEPHONE (OUTPATIENT)
Dept: OBGYN CLINIC | Facility: CLINIC | Age: 25
End: 2020-02-03

## 2020-12-01 ENCOUNTER — ANNUAL EXAM (OUTPATIENT)
Dept: OBGYN CLINIC | Facility: CLINIC | Age: 25
End: 2020-12-01
Payer: COMMERCIAL

## 2020-12-01 VITALS
SYSTOLIC BLOOD PRESSURE: 112 MMHG | HEIGHT: 64 IN | WEIGHT: 184 LBS | DIASTOLIC BLOOD PRESSURE: 74 MMHG | BODY MASS INDEX: 31.41 KG/M2

## 2020-12-01 DIAGNOSIS — Z01.419 WOMEN'S ANNUAL ROUTINE GYNECOLOGICAL EXAMINATION: Primary | ICD-10-CM

## 2020-12-01 DIAGNOSIS — N76.0 BV (BACTERIAL VAGINOSIS): ICD-10-CM

## 2020-12-01 DIAGNOSIS — B96.89 BV (BACTERIAL VAGINOSIS): ICD-10-CM

## 2020-12-01 DIAGNOSIS — Z30.41 SURVEILLANCE FOR BIRTH CONTROL, ORAL CONTRACEPTIVES: ICD-10-CM

## 2020-12-01 PROCEDURE — S0612 ANNUAL GYNECOLOGICAL EXAMINA: HCPCS | Performed by: NURSE PRACTITIONER

## 2020-12-01 RX ORDER — NORETHINDRONE ACETATE AND ETHINYL ESTRADIOL 1MG-20(21)
1 KIT ORAL DAILY
Qty: 84 TABLET | Refills: 3 | Status: SHIPPED | OUTPATIENT
Start: 2020-12-01 | End: 2021-08-03 | Stop reason: DRUGHIGH

## 2020-12-01 RX ORDER — MECLIZINE HYDROCHLORIDE 25 MG/1
25 TABLET ORAL 3 TIMES DAILY PRN
COMMUNITY
Start: 2020-04-23 | End: 2021-08-17 | Stop reason: ALTCHOICE

## 2020-12-01 RX ORDER — RIZATRIPTAN BENZOATE 10 MG/1
10 TABLET ORAL AS NEEDED
COMMUNITY
Start: 2020-04-23 | End: 2021-11-16

## 2020-12-01 RX ORDER — METRONIDAZOLE 500 MG/1
500 TABLET ORAL EVERY 12 HOURS SCHEDULED
Qty: 14 TABLET | Refills: 0 | Status: SHIPPED | OUTPATIENT
Start: 2020-12-01 | End: 2020-12-08

## 2021-01-19 DIAGNOSIS — B00.1 RECURRENT HERPES LABIALIS: ICD-10-CM

## 2021-01-20 RX ORDER — VALACYCLOVIR HYDROCHLORIDE 1 G/1
TABLET, FILM COATED ORAL
Qty: 12 TABLET | Refills: 3 | Status: SHIPPED | OUTPATIENT
Start: 2021-01-20 | End: 2021-05-05 | Stop reason: ALTCHOICE

## 2021-03-09 ENCOUNTER — TRANSCRIBE ORDERS (OUTPATIENT)
Dept: ADMINISTRATIVE | Age: 26
End: 2021-03-09

## 2021-03-09 ENCOUNTER — APPOINTMENT (OUTPATIENT)
Dept: LAB | Age: 26
End: 2021-03-09
Payer: COMMERCIAL

## 2021-03-09 DIAGNOSIS — M20.11 ACQUIRED HALLUX VALGUS OF RIGHT FOOT: Primary | ICD-10-CM

## 2021-03-09 DIAGNOSIS — M20.11 ACQUIRED HALLUX VALGUS OF RIGHT FOOT: ICD-10-CM

## 2021-03-09 LAB
B-HCG SERPL-ACNC: <2 MIU/ML
BASOPHILS # BLD AUTO: 0.06 THOUSANDS/ΜL (ref 0–0.1)
BASOPHILS NFR BLD AUTO: 1 % (ref 0–1)
EOSINOPHIL # BLD AUTO: 0.13 THOUSAND/ΜL (ref 0–0.61)
EOSINOPHIL NFR BLD AUTO: 1 % (ref 0–6)
ERYTHROCYTE [DISTWIDTH] IN BLOOD BY AUTOMATED COUNT: 12.7 % (ref 11.6–15.1)
HCT VFR BLD AUTO: 45.6 % (ref 34.8–46.1)
HGB BLD-MCNC: 15.1 G/DL (ref 11.5–15.4)
IMM GRANULOCYTES # BLD AUTO: 0.02 THOUSAND/UL (ref 0–0.2)
IMM GRANULOCYTES NFR BLD AUTO: 0 % (ref 0–2)
LYMPHOCYTES # BLD AUTO: 3.68 THOUSANDS/ΜL (ref 0.6–4.47)
LYMPHOCYTES NFR BLD AUTO: 40 % (ref 14–44)
MCH RBC QN AUTO: 29 PG (ref 26.8–34.3)
MCHC RBC AUTO-ENTMCNC: 33.1 G/DL (ref 31.4–37.4)
MCV RBC AUTO: 88 FL (ref 82–98)
MONOCYTES # BLD AUTO: 0.7 THOUSAND/ΜL (ref 0.17–1.22)
MONOCYTES NFR BLD AUTO: 8 % (ref 4–12)
NEUTROPHILS # BLD AUTO: 4.64 THOUSANDS/ΜL (ref 1.85–7.62)
NEUTS SEG NFR BLD AUTO: 50 % (ref 43–75)
NRBC BLD AUTO-RTO: 0 /100 WBCS
PLATELET # BLD AUTO: 272 THOUSANDS/UL (ref 149–390)
PMV BLD AUTO: 12.5 FL (ref 8.9–12.7)
RBC # BLD AUTO: 5.2 MILLION/UL (ref 3.81–5.12)
WBC # BLD AUTO: 9.23 THOUSAND/UL (ref 4.31–10.16)

## 2021-03-09 PROCEDURE — 85025 COMPLETE CBC W/AUTO DIFF WBC: CPT

## 2021-03-09 PROCEDURE — 84702 CHORIONIC GONADOTROPIN TEST: CPT

## 2021-03-09 PROCEDURE — 36415 COLL VENOUS BLD VENIPUNCTURE: CPT

## 2021-03-10 RX ORDER — GLYCERIN/MIN OIL/POLYCARBOPHIL
GEL WITH APPLICATOR (GRAM) VAGINAL
COMMUNITY
Start: 2021-01-20

## 2021-03-12 ENCOUNTER — OFFICE VISIT (OUTPATIENT)
Dept: FAMILY MEDICINE CLINIC | Facility: CLINIC | Age: 26
End: 2021-03-12
Payer: COMMERCIAL

## 2021-03-12 VITALS
HEART RATE: 66 BPM | SYSTOLIC BLOOD PRESSURE: 110 MMHG | HEIGHT: 64 IN | TEMPERATURE: 98.2 F | RESPIRATION RATE: 14 BRPM | WEIGHT: 175 LBS | OXYGEN SATURATION: 97 % | BODY MASS INDEX: 29.88 KG/M2 | DIASTOLIC BLOOD PRESSURE: 68 MMHG

## 2021-03-12 DIAGNOSIS — Z01.818 PREOP GENERAL PHYSICAL EXAM: Primary | ICD-10-CM

## 2021-03-12 DIAGNOSIS — M21.611 BUNION OF GREAT TOE OF RIGHT FOOT: ICD-10-CM

## 2021-03-12 PROBLEM — M20.11 ACQUIRED HALLUX VALGUS OF RIGHT FOOT: Status: RESOLVED | Noted: 2021-03-12 | Resolved: 2021-03-12

## 2021-03-12 PROBLEM — J02.9 ACUTE PHARYNGITIS: Status: RESOLVED | Noted: 2019-07-10 | Resolved: 2021-03-12

## 2021-03-12 PROBLEM — M20.11 ACQUIRED HALLUX VALGUS OF RIGHT FOOT: Status: ACTIVE | Noted: 2021-03-12

## 2021-03-12 PROCEDURE — 3008F BODY MASS INDEX DOCD: CPT | Performed by: FAMILY MEDICINE

## 2021-03-12 PROCEDURE — 1036F TOBACCO NON-USER: CPT | Performed by: FAMILY MEDICINE

## 2021-03-12 PROCEDURE — 93000 ELECTROCARDIOGRAM COMPLETE: CPT | Performed by: FAMILY MEDICINE

## 2021-03-12 PROCEDURE — 3725F SCREEN DEPRESSION PERFORMED: CPT | Performed by: FAMILY MEDICINE

## 2021-03-12 PROCEDURE — 99214 OFFICE O/P EST MOD 30 MIN: CPT | Performed by: FAMILY MEDICINE

## 2021-03-12 NOTE — ASSESSMENT & PLAN NOTE
EKG done in the office showed sinus arrhythmia, 68 bpm   No acute ST- T wave changes  Normal variant of EKG  Patient denies easy bruising or bleeding  No allergy to anesthesia drugs  Patient is medically stable to proceed with right foot bunionectomy scheduled with podiatrist Dr Kriss Brenner on 3/22/21  Recommended to avoid Aspirin, NSAID's 1 week prior to surgery

## 2021-03-12 NOTE — PROGRESS NOTES
Chief Complaint   Patient presents with    Pre-op Exam     3/22/2021 Right Earvin Pitch - Dr Sacha Art Maintenance   Topic Date Due    HIV Screening  01/18/2009    DTaP,Tdap,and Td Vaccines (6 - Td) 12/27/2017    BMI: Followup Plan  07/10/2020    Influenza Vaccine (1) 09/01/2020    Annual Physical  12/01/2021    Depression Screening PHQ  03/12/2022    BMI: Adult  03/12/2022    Cervical Cancer Screening  12/09/2022    HIB Vaccine  Completed    Hepatitis B Vaccine  Completed    IPV Vaccine  Completed    Pneumococcal Vaccine: Pediatrics (0 to 5 Years) and At-Risk Patients (6 to 59 Years)  Aged Out    Hepatitis A Vaccine  Aged Out    Meningococcal ACWY Vaccine  Aged Out    HPV Vaccine  Aged Out         BMI Counseling: Body mass index is 30 04 kg/m²  The BMI is above normal  Nutrition recommendations include decreasing portion sizes, encouraging healthy choices of fruits and vegetables, decreasing fast food intake, consuming healthier snacks, limiting drinks that contain sugar, moderation in carbohydrate intake, increasing intake of lean protein, reducing intake of saturated and trans fat and reducing intake of cholesterol  Exercise recommendations include exercising 3-5 times per week  No pharmacotherapy was ordered  Assessment/Plan:    Preop general physical exam  EKG done in the office showed sinus arrhythmia, 68 bpm   No acute ST- T wave changes  Normal variant of EKG  Patient denies easy bruising or bleeding  No allergy to anesthesia drugs  Patient is medically stable to proceed with right foot bunionectomy scheduled with podiatrist Dr Amrik Franklin on 3/22/21  Recommended to avoid Aspirin, NSAID's 1 week prior to surgery  Diagnoses and all orders for this visit:    Preop general physical exam  -     POCT ECG    Bunion of great toe of right foot          Subjective:      Patient ID: Katherine Gann is a 31 y o  female      HPI     Patient presents for pre-op evaluation prior to right foot bunionectomy schedule at with podiatrist Dr Elaina Frank on 3/22/21  Patient c/o painful right great toe bunion deformity  Denies foot swelling  Patient reports no easy bruising or bleeding  No prior history of allergy to anesthesia drugs  Patient had pre-admission testing done on March 9, 2021  HCG quant  < 2  WBC 9 23 thousands, RBC 5 20 thousands, Hb 15 1  Patient denies chest pain, shortness of breath, dizziness  No abdominal pain,  Nausea, vomiting, diarrhea  Denies tobacco, drug use  Drinks alcohol occasionally  Currently taking BCP  The following portions of the patient's history were reviewed and updated as appropriate: allergies, current medications, past family history, past social history, past surgical history and problem list     Review of Systems   Constitutional: Negative for activity change, appetite change, chills, fatigue and fever  HENT: Negative for congestion, ear pain, nosebleeds, sore throat and trouble swallowing  Eyes: Negative for pain, discharge, redness, itching and visual disturbance  Respiratory: Negative for cough, chest tightness, shortness of breath and wheezing  Cardiovascular: Negative for chest pain, palpitations and leg swelling  Gastrointestinal: Negative for abdominal pain, blood in stool, constipation, diarrhea, nausea and vomiting  Genitourinary: Negative for difficulty urinating, dysuria, flank pain, frequency, hematuria and pelvic pain  Musculoskeletal: Positive for arthralgias (right great toe pain)  Negative for back pain, gait problem, joint swelling and neck pain  Skin: Negative for rash and wound  Neurological: Negative for dizziness, seizures, syncope and headaches  Hematological: Negative  Psychiatric/Behavioral: Negative for dysphoric mood and sleep disturbance  The patient is not nervous/anxious            Objective:      /68 (BP Location: Left arm, Patient Position: Sitting, Cuff Size: Adult)   Pulse 66   Temp 98 2 °F (36 8 °C) (Tympanic)   Resp 14   Ht 5' 4" (1 626 m)   Wt 79 4 kg (175 lb)   SpO2 97%   BMI 30 04 kg/m²          Physical Exam  Vitals signs and nursing note reviewed  Constitutional:       Appearance: Normal appearance  Comments: Mildly obese   HENT:      Head: Normocephalic and atraumatic  Right Ear: Tympanic membrane and external ear normal       Left Ear: External ear normal    Eyes:      Conjunctiva/sclera: Conjunctivae normal       Pupils: Pupils are equal, round, and reactive to light  Neck:      Musculoskeletal: Normal range of motion and neck supple  No muscular tenderness  Cardiovascular:      Rate and Rhythm: Normal rate and regular rhythm  Heart sounds: No murmur  Pulmonary:      Effort: Pulmonary effort is normal       Breath sounds: Normal breath sounds  Abdominal:      General: Bowel sounds are normal  There is no distension  Palpations: Abdomen is soft  Tenderness: There is no abdominal tenderness  Musculoskeletal:      Right lower leg: No edema  Left lower leg: No edema  Comments: Right foot exam: bunion deformity  No joints swelling or tenderness  Lymphadenopathy:      Cervical: No cervical adenopathy  Skin:     General: Skin is dry  Findings: No rash  Neurological:      General: No focal deficit present  Mental Status: She is alert  Motor: No weakness        Gait: Gait normal    Psychiatric:         Mood and Affect: Mood normal

## 2021-03-17 NOTE — PRE-PROCEDURE INSTRUCTIONS
Pre-Surgery Instructions:   Medication Instructions    [facility] Acetaminophen Ex St 500 MG tablet Instructed patient per Anesthesia Guidelines   meclizine (ANTIVERT) 25 mg tablet Instructed patient per Anesthesia Guidelines   norethindrone-ethinyl estradiol (JUNEL FE 1/20) 1-20 MG-MCG per tablet Instructed patient per Anesthesia Guidelines   rizatriptan (MAXALT) 10 MG tablet Instructed patient per Anesthesia Guidelines   valACYclovir (VALTREX) 1,000 mg tablet Instructed patient per Anesthesia Guidelines  [facility] preop instructions reviewed with pt  Pt has surgical soap

## 2021-03-19 ENCOUNTER — ANESTHESIA EVENT (OUTPATIENT)
Dept: PERIOP | Facility: HOSPITAL | Age: 26
End: 2021-03-19
Payer: COMMERCIAL

## 2021-03-22 ENCOUNTER — HOSPITAL ENCOUNTER (OUTPATIENT)
Facility: HOSPITAL | Age: 26
Setting detail: OUTPATIENT SURGERY
Discharge: HOME | End: 2021-03-22
Attending: PODIATRIST | Admitting: PODIATRIST
Payer: COMMERCIAL

## 2021-03-22 ENCOUNTER — ANESTHESIA (OUTPATIENT)
Dept: PERIOP | Facility: HOSPITAL | Age: 26
End: 2021-03-22
Payer: COMMERCIAL

## 2021-03-22 ENCOUNTER — APPOINTMENT (OUTPATIENT)
Dept: RADIOLOGY | Facility: HOSPITAL | Age: 26
End: 2021-03-22
Payer: COMMERCIAL

## 2021-03-22 VITALS
SYSTOLIC BLOOD PRESSURE: 122 MMHG | HEART RATE: 69 BPM | BODY MASS INDEX: 29.88 KG/M2 | DIASTOLIC BLOOD PRESSURE: 58 MMHG | WEIGHT: 175 LBS | TEMPERATURE: 97.7 F | OXYGEN SATURATION: 99 % | RESPIRATION RATE: 18 BRPM | HEIGHT: 64 IN

## 2021-03-22 PROBLEM — E66.811 OBESITY (BMI 30.0-34.9): Status: ACTIVE | Noted: 2021-03-22

## 2021-03-22 PROBLEM — G43.909 MIGRAINE: Status: ACTIVE | Noted: 2021-03-22

## 2021-03-22 PROBLEM — E66.9 OBESITY (BMI 30.0-34.9): Status: ACTIVE | Noted: 2021-03-22

## 2021-03-22 LAB
EXT PREGNANCY TEST URINE: NEGATIVE
EXT. CONTROL: NORMAL

## 2021-03-22 PROCEDURE — 81025 URINE PREGNANCY TEST: CPT | Performed by: ANESTHESIOLOGY

## 2021-03-22 PROCEDURE — 73630 X-RAY EXAM OF FOOT: CPT

## 2021-03-22 PROCEDURE — C1713 ANCHOR/SCREW BN/BN,TIS/BN: HCPCS | Performed by: PODIATRIST

## 2021-03-22 DEVICE — 2.0MM CORTEX SCREW SLF-TPNG WITH STARDRIVE RECESS 16MM: Type: IMPLANTABLE DEVICE | Status: FUNCTIONAL

## 2021-03-22 DEVICE — 2.0MM CORTEX SCREW SLF-TPNG WITH STARDRIVE RECESS 20MM: Type: IMPLANTABLE DEVICE | Status: FUNCTIONAL

## 2021-03-22 RX ORDER — ONDANSETRON 2 MG/ML
4 INJECTION INTRAMUSCULAR; INTRAVENOUS ONCE AS NEEDED
Status: COMPLETED | OUTPATIENT
Start: 2021-03-22 | End: 2021-03-22

## 2021-03-22 RX ORDER — CEFAZOLIN SODIUM 1 G/50ML
SOLUTION INTRAVENOUS AS NEEDED
Status: DISCONTINUED | OUTPATIENT
Start: 2021-03-22 | End: 2021-03-22

## 2021-03-22 RX ORDER — MIDAZOLAM HYDROCHLORIDE 2 MG/2ML
INJECTION, SOLUTION INTRAMUSCULAR; INTRAVENOUS AS NEEDED
Status: DISCONTINUED | OUTPATIENT
Start: 2021-03-22 | End: 2021-03-22

## 2021-03-22 RX ORDER — FENTANYL CITRATE/PF 50 MCG/ML
50 SYRINGE (ML) INJECTION
Status: DISCONTINUED | OUTPATIENT
Start: 2021-03-22 | End: 2021-03-22 | Stop reason: HOSPADM

## 2021-03-22 RX ORDER — ONDANSETRON 2 MG/ML
INJECTION INTRAMUSCULAR; INTRAVENOUS AS NEEDED
Status: DISCONTINUED | OUTPATIENT
Start: 2021-03-22 | End: 2021-03-22

## 2021-03-22 RX ORDER — LIDOCAINE HYDROCHLORIDE 10 MG/ML
INJECTION, SOLUTION EPIDURAL; INFILTRATION; INTRACAUDAL; PERINEURAL AS NEEDED
Status: DISCONTINUED | OUTPATIENT
Start: 2021-03-22 | End: 2021-03-22

## 2021-03-22 RX ORDER — BUPIVACAINE HYDROCHLORIDE 5 MG/ML
INJECTION, SOLUTION PERINEURAL AS NEEDED
Status: DISCONTINUED | OUTPATIENT
Start: 2021-03-22 | End: 2021-03-22 | Stop reason: HOSPADM

## 2021-03-22 RX ORDER — DEXAMETHASONE SODIUM PHOSPHATE 10 MG/ML
INJECTION, SOLUTION INTRAMUSCULAR; INTRAVENOUS AS NEEDED
Status: DISCONTINUED | OUTPATIENT
Start: 2021-03-22 | End: 2021-03-22

## 2021-03-22 RX ORDER — SODIUM CHLORIDE 9 MG/ML
125 INJECTION, SOLUTION INTRAVENOUS CONTINUOUS
Status: DISCONTINUED | OUTPATIENT
Start: 2021-03-22 | End: 2021-03-22 | Stop reason: HOSPADM

## 2021-03-22 RX ORDER — PROPOFOL 10 MG/ML
INJECTION, EMULSION INTRAVENOUS AS NEEDED
Status: DISCONTINUED | OUTPATIENT
Start: 2021-03-22 | End: 2021-03-22

## 2021-03-22 RX ORDER — CEFAZOLIN SODIUM 1 G/50ML
1000 SOLUTION INTRAVENOUS ONCE
Status: CANCELLED | OUTPATIENT
Start: 2021-03-22

## 2021-03-22 RX ORDER — FENTANYL CITRATE 50 UG/ML
INJECTION, SOLUTION INTRAMUSCULAR; INTRAVENOUS AS NEEDED
Status: DISCONTINUED | OUTPATIENT
Start: 2021-03-22 | End: 2021-03-22

## 2021-03-22 RX ORDER — OXYCODONE HYDROCHLORIDE AND ACETAMINOPHEN 5; 325 MG/1; MG/1
1 TABLET ORAL EVERY 4 HOURS PRN
Status: DISCONTINUED | OUTPATIENT
Start: 2021-03-22 | End: 2021-03-22 | Stop reason: HOSPADM

## 2021-03-22 RX ORDER — KETOROLAC TROMETHAMINE 30 MG/ML
INJECTION, SOLUTION INTRAMUSCULAR; INTRAVENOUS AS NEEDED
Status: DISCONTINUED | OUTPATIENT
Start: 2021-03-22 | End: 2021-03-22

## 2021-03-22 RX ADMIN — DEXAMETHASONE SODIUM PHOSPHATE 4 MG: 10 INJECTION, SOLUTION INTRAMUSCULAR; INTRAVENOUS at 08:59

## 2021-03-22 RX ADMIN — FENTANYL CITRATE 50 MCG: 50 INJECTION INTRAMUSCULAR; INTRAVENOUS at 10:35

## 2021-03-22 RX ADMIN — KETOROLAC TROMETHAMINE 30 MG: 30 INJECTION, SOLUTION INTRAMUSCULAR at 09:54

## 2021-03-22 RX ADMIN — SODIUM CHLORIDE 125 ML/HR: 0.9 INJECTION, SOLUTION INTRAVENOUS at 08:01

## 2021-03-22 RX ADMIN — SODIUM CHLORIDE: 0.9 INJECTION, SOLUTION INTRAVENOUS at 09:41

## 2021-03-22 RX ADMIN — ONDANSETRON 4 MG: 2 INJECTION INTRAMUSCULAR; INTRAVENOUS at 08:59

## 2021-03-22 RX ADMIN — ONDANSETRON 4 MG: 2 INJECTION INTRAMUSCULAR; INTRAVENOUS at 10:22

## 2021-03-22 RX ADMIN — FENTANYL CITRATE 100 MCG: 50 INJECTION, SOLUTION INTRAMUSCULAR; INTRAVENOUS at 08:53

## 2021-03-22 RX ADMIN — FENTANYL CITRATE 25 MCG: 50 INJECTION, SOLUTION INTRAMUSCULAR; INTRAVENOUS at 09:18

## 2021-03-22 RX ADMIN — MIDAZOLAM 2 MG: 1 INJECTION INTRAMUSCULAR; INTRAVENOUS at 08:49

## 2021-03-22 RX ADMIN — PROPOFOL 200 MG: 10 INJECTION, EMULSION INTRAVENOUS at 08:55

## 2021-03-22 RX ADMIN — LIDOCAINE HYDROCHLORIDE 40 MG: 10 INJECTION, SOLUTION EPIDURAL; INFILTRATION; INTRACAUDAL; PERINEURAL at 08:55

## 2021-03-22 RX ADMIN — CEFAZOLIN SODIUM 1000 MG: 1 SOLUTION INTRAVENOUS at 08:40

## 2021-03-22 RX ADMIN — FENTANYL CITRATE 25 MCG: 50 INJECTION, SOLUTION INTRAMUSCULAR; INTRAVENOUS at 09:53

## 2021-03-22 NOTE — OP NOTE
OPERATIVE REPORT - Podiatry  PATIENT NAME: Ann Wilcox    :  1995  MRN: [id number]  Pt Location: [facility] 05    SURGERY DATE: 3/22/2021    Surgeon(s) and Role:     * Akua Arias DPM - Primary     * Alison Kelly DPM - Assisting    Pre-op Diagnosis:  Hallux valgus (acquired), right foot [M20 11]    Post-Op Diagnosis Codes:     * Hallux valgus (acquired), right foot [M20 11]    Procedure(s) (LRB):  BUNIONECTOMY KIZZY (Right)    Specimen(s):  * No specimens in log *    Estimated Blood Loss:   Minimal    Drains:  * No LDAs found *    Anesthesia Type:   IV Sedation with Anesthesia with 10 ml of 1% Lidocaine and 0 5% Bupivacaine in a 1:1 mixture  Postoperatively 7 cc of 0 5% Marcaine plain    Hemostasis:  Pneumatic ankle tourniquet at 250 mmHg for 64 minutes    Materials:  Implant Name Type Inv  Item Serial No   Lot No  LRB No  Used Action   SCREW CRTX 2 X 16MM T6 STRDRV SLF TAP - [id number]  SCREW CRTX 2 X 16MM T6 STRDRV SLF TAP  Synthes  Right 1 Implanted   SCREW CRTX 2 X 20MM T6 STRDRV SLF TAP - [id number]  SCREW CRTX 2 X 20MM T6 STRDRV SLF TAP  Synthes  Right 1 Implanted     3-0 Vicryl, 4-0 Vicryl, 4-0 Monocryl    Operative Findings:  Consistent with diagnosis  Intraoperatively there was denuded cartilage noted on the dorsal aspect of the 1st metatarsal head along with dorsal metatarsal head exostosis which limited metatarsophalangeal joint range of motion  Complications:   None    Indications:  Patient has chronic pain right 1st MPJ with prominent 1st metatarsal head and bunion deformity  Patient has tried wider shoes, softer top shoes, padding  Pain is consistent on a daily basis and has limited her activity and shoes that she can wear  Patient now desires surgical intervention  Procedure and Technique:     Under mild sedation, the patient was brought into the operating room and placed on the operating room table in the supine position   IV sedation was achieved by anesthesia team and a universal timeout was performed where all parties are in agreement of correct patient, correct procedure and correct site  A pneumatic tourniquet was then placed over the patient's right lower extremity with ample padding  A britton block was performed consisting of 10 ml of 1% Lidocaine and 0 5% Bupivacaine in a 1:1 mixture  The foot was then prepped and draped in the usual aseptic manner  An esmarch bandage was used to exsangunate the foot and the pneumatic tourniquet was then inflated to 250mmHg  Attention was then directed to the dorsal aspect of the first metatarsal where an approximately 5 cm linear incision was made  The incision was deepened through the subcutaneous tissues using sharp and blunt dissection  Care was taken to identify and retract all vital neural and vascular structures  All bleeders were cauterized and ligated as necessary  A capsuloptomy was performed over the dorsal aspect of the MPJ  The periosteal and capsular structures were then carefully dissected free of their osseous attachments and reflected medially and laterally, thus exposing the head of the first metatarsal at the operative site  Attention was then directed to the 1st interspace via the original skin incision where the dissection was continued deep using sharp dissection down to the level of the fibular sesamoid which was free from its soft tissue attachments proximally, laterally and distally  The conjoined tendon of the adductor halluces was then identified and transected at its attachment  At this time the lateral contraction presents on the hallux was noted to be reduced and the sesamoid apparatus was noted to float into a more corrected medial position  Attention was then directed to the first met head where the medial prominence was resected by the sagittal bone saw  A k-wire was used as a guidewire at the medial aspect of the 1st met head   A through and through V type osteotomy was made at a 60 degree angle  This cut was created in the metataphyseal region of the bone utilizing a sagittal bone saw and the apices of this osteotomy pointing proximal plantarly and proximal dorsally  Upon completion of this osteotomy, the capital fragment was distracted and shifted laterally into a more corrected position and impacted onto the shaft of the first met  K wires were used as temp fixation across the osteotomy site  With proper AO technique two Synthes screws were fixated across the osteotomy site, size listed above  Attention was directed to the remaining medial bone shelf proximal to the osteotomy site which was resected using a sagittal saw and passed from operative field  Correction of the deformity was assessed at this time and noted to be adequate  The wound was then flushed with copious amounts of sterile saline  The periosteal and capsular structures were reapproximated using 3-0 Vicryl  The subQ tissues were reapproximated using 4-0 Vicryl and the skin was reapproximated using 4-0 Monocril in a running subcuticular suture technique  The incision was then dressed with Steri-Strips, Adaptic, Dry sterile dressing  The pneumatic ankle tourniquet was then deflated and a prompt hyperemic response was noted to all digits of the foot  The patient tolerated the procedure and anesthesia well and was transferred to the PACU with vital signs stable    [facility] was present during the entire procedure and participated in all key aspects  [facility] HospitalTERESA Ramírez  DATE: March 22, 2021  TIME: 10:19 AM      Portions of the record may have been created with voice recognition software  Occasional wrong word or "sound a like" substitutions may have occurred due to the inherent limitations of voice recognition software  Read the chart carefully and recognize, using context, where substitutions have occurred

## 2021-03-22 NOTE — ANESTHESIA PREPROCEDURE EVALUATION
Procedure:  BUNIONECTOMY KIZZY (Right Foot)    Relevant Problems   CARDIO   (+) Migraine      Other   (+) Bunion of great toe of right foot   (+) Obesity (BMI 30 0-34  9)        Physical Exam    Airway    Mallampati score: II  TM Distance: >3 FB  Neck ROM: full     Dental   No notable dental hx     Cardiovascular  Rhythm: regular, Rate: normal, Cardiovascular exam normal    Pulmonary  Pulmonary exam normal Breath sounds clear to auscultation,     Other Findings        Anesthesia Plan  ASA Score- 2     Anesthesia Type- general with ASA Monitors  Additional Monitors:   Airway Plan: LMA  Plan Factors-Exercise tolerance (METS): >4 METS  Chart reviewed  Patient summary reviewed  Patient is not a current smoker  Patient did not smoke on day of surgery  Induction- intravenous  Postoperative Plan-     Informed Consent- Anesthetic plan and risks discussed with patient and mother  I personally reviewed this patient with the CRNA  Discussed and agreed on the Anesthesia Plan with the CRNA  Fernando Johnson

## 2021-03-22 NOTE — INTERVAL H&P NOTE
H&P reviewed  After examining the patient I find no changes in the patients condition since the H&P had been written      Vitals:    03/22/21 0744   BP: 125/78   Pulse: 86   Resp: 16   Temp: 98 1 °F (36 7 °C)   SpO2: 98%

## 2021-03-22 NOTE — DISCHARGE SUMMARY
Discharge Summary Outpatient Procedure Podiatry -   [place] Billing 31 y o  female MRN: [id number]  Unit/Bed#: OR POOL Encounter: [id number]    Admission Date: 3/22/2021     Admitting Diagnosis: Hallux valgus (acquired), right foot [M20 11]    Discharge Diagnosis: same    Procedures Performed: Erickson Higinio: 08707 (CPT®)    Complications: none    Condition at Discharge: stable    Discharge instructions/Information to patient and family:   See after visit summary for information provided to patient and family  Provisions for Follow-Up Care/Important appointments:  See after visit summary for information related to follow-up care and any pertinent home health orders  Discharge Medications:  See after visit summary for reconciled discharge medications provided to patient and family

## 2021-03-22 NOTE — DISCHARGE INSTRUCTIONS
Dr Wilton Diaz Instructions    1  Take your prescribed medication as directed  2  Upon arrival at home, lie down and elevate your surgical foot on 2 pillows  3  Remain quiet, off your feet as much as possible, for the first 24-48 hours  This is when your feet first swell and may become painful  After 48 hours you may begin limited walking following these restrictions:   Nonweightbearinbg to surgical foot  4  Drink large quantities of water  Consume no alcohol  Continue a well-balanced diet  5  Report any unusual discomfort or fever to this office  6  A limited amount of discomfort and swelling is to be expected  In some cases the skin may take on a bruised appearance  The surgical solution that was applied to your foot prior to the operation is dark in color and the operation site may appear to be oozing when it actually is not  7  A slight amount of blood is to be expected, and is no cause for alarm  Do not remove the dressings  If there is active bleeding and if the bleeding persists, add additional gauze to the bandage, apply direct pressure, elevate your feet and call this office  8  Do not get the dressings wet  As regular bathing may be inconvenient, sponge baths are recommended  9  When anesthesia wears off and if any discomfort should be present, apply an ice pack directly over the operated area for 15 minute intervals for several hours or until the pain leaves  (USE IN EXCESS OF 15 MINUTES COULD CAUSE FROSTBITE)  Do not use hot water bags or electric pads  A convenient icepack can be made by placing ice cubes in a plastic bag and covering this with a towel  10  If necessary, take a mild laxative before retiring  11  Wear your special open shoes anytime you put weight on your foot, even if it is just to walk to the bathroom and back  It will probably be 2 or 3 weeks before you will be permitted to try regular shoes    12  Having performed the operation, we are interested in a prompt recovery  Please cooperate by following the above instructions  13  Please call to confirm your post-op appointment or call with any other questions

## 2021-04-26 ENCOUNTER — TELEPHONE (OUTPATIENT)
Dept: FAMILY MEDICINE CLINIC | Facility: CLINIC | Age: 26
End: 2021-04-26

## 2021-04-26 DIAGNOSIS — B00.1 RECURRENT HERPES LABIALIS: Primary | ICD-10-CM

## 2021-04-26 RX ORDER — FAMCICLOVIR 500 MG/1
TABLET, FILM COATED ORAL
Qty: 21 TABLET | Refills: 0 | Status: SHIPPED | OUTPATIENT
Start: 2021-04-26 | End: 2021-05-05 | Stop reason: SDUPTHER

## 2021-04-26 NOTE — TELEPHONE ENCOUNTER
Please call patient  She can try Famvir  Recommend to take Famvir 500 mg 3 times daily for 1 week  Rx sent to pharmacy

## 2021-04-26 NOTE — TELEPHONE ENCOUNTER
Patient called back and is asking what she will do then when she feels another cold sore coming on if she won't have any preventative pills on hand? She did request a call back from you specifically as she has some questions  Please reach out when you have the chance

## 2021-04-26 NOTE — TELEPHONE ENCOUNTER
Patient is due for a refill of her valtrex, but feels it is not working for her cold sores  They now come out as if she isn't on the medication  She is requesting a replacement medication be sent to [facility]

## 2021-04-27 NOTE — TELEPHONE ENCOUNTER
I called patient, reviewed treatment for recurrent cold sores  After completion therapy with Famvir recommended to call with update on symptoms  Take Famvir 1500 mg x 1day for cold sore outbreaks  Recommended to start Vit D 2000 IU daily, Vit B complex daily to boost immune system  Consider referral to dermatology if continues with recurrent cold sores  Patient verbalized understanding, all questions were answered

## 2021-05-05 ENCOUNTER — TELEPHONE (OUTPATIENT)
Dept: FAMILY MEDICINE CLINIC | Facility: CLINIC | Age: 26
End: 2021-05-05

## 2021-05-05 DIAGNOSIS — B00.1 RECURRENT HERPES LABIALIS: ICD-10-CM

## 2021-05-05 RX ORDER — FAMCICLOVIR 500 MG/1
TABLET, FILM COATED ORAL
Qty: 21 TABLET | Refills: 1 | Status: SHIPPED | OUTPATIENT
Start: 2021-05-05 | End: 2021-08-17 | Stop reason: ALTCHOICE

## 2021-05-05 NOTE — TELEPHONE ENCOUNTER
New med ordered for cold sores  Calling to report it is working well, Dakota Hylton  Would like  Dr to order refills on this med

## 2021-05-05 NOTE — TELEPHONE ENCOUNTER
Please inform patient that prescription was sent to the pharmacy for Famvir 500 mg to take 3 tablets for 1 day for cold sores outbreaks

## 2021-06-18 ENCOUNTER — OFFICE VISIT (OUTPATIENT)
Dept: OBGYN CLINIC | Facility: CLINIC | Age: 26
End: 2021-06-18
Payer: COMMERCIAL

## 2021-06-18 VITALS
SYSTOLIC BLOOD PRESSURE: 120 MMHG | WEIGHT: 173 LBS | DIASTOLIC BLOOD PRESSURE: 78 MMHG | HEIGHT: 64 IN | BODY MASS INDEX: 29.53 KG/M2

## 2021-06-18 DIAGNOSIS — N92.6 MENSTRUAL PROBLEM: Primary | ICD-10-CM

## 2021-06-18 PROBLEM — M20.11 HALLUX VALGUS (ACQUIRED), RIGHT FOOT: Status: ACTIVE | Noted: 2021-02-25

## 2021-06-18 PROCEDURE — 99212 OFFICE O/P EST SF 10 MIN: CPT | Performed by: NURSE PRACTITIONER

## 2021-06-18 NOTE — PROGRESS NOTES
Becka Yusuf Chamberlain 31year-old here with complaint of cycle changing on her current pill  Her OCP was changed in December 2020 from a triphasic to monophasic pill  Cycles have been fine up until April  In April she missed a menses  In May she had a heavy menses and after it stopped she experienced a week of spotting  The only change has been she had foot surgery in March  Patient's last menstrual period was 05/24/2021  ROS:  As indicated in HPI  All other ROS negative  Vitals:    06/18/21 0907   BP: 120/78       Lataseamus Rose was seen today for contraception  Diagnoses and all orders for this visit:    Menstrual problem      I expressed that the change she had from April to May may have been caused by her surgery in March  Sometimes stress on the body can affect our menstrual cycle  I recommended to watch and wait what her cycles does in June and July  If she continues to experience irregular patterns then we can change her OCP to the next dose which is 1 5/30  She can sent me a note through [employer] with an update on her pattern and we can decide from there the plan of care

## 2021-07-26 ENCOUNTER — TELEPHONE (OUTPATIENT)
Dept: OBGYN CLINIC | Facility: CLINIC | Age: 26
End: 2021-07-26

## 2021-07-26 NOTE — TELEPHONE ENCOUNTER
She can stay on her current OCP  It is not uncommon to skip menses on this OCP or have a light menses

## 2021-07-26 NOTE — TELEPHONE ENCOUNTER
----- Message from Manjit Chambrelain sent at 7/26/2021  9:26 AM EDT -----  Regarding: Visit Follow-Up Question  Contact: [phone]  Good morning,    I wanted to follow up with an update from my last appointment regarding my irregular menstrual cycles  I did not get my period in the month of June and for the month of July I had it for one day, but it was extremely light  It looks like the pattern of every other month is when I get my period  What do you suggest I do moving forward?      Thank you,  Roscoe Nichols

## 2021-08-03 ENCOUNTER — TELEPHONE (OUTPATIENT)
Dept: OBGYN CLINIC | Facility: CLINIC | Age: 26
End: 2021-08-03

## 2021-08-03 DIAGNOSIS — Z30.41 SURVEILLANCE FOR BIRTH CONTROL, ORAL CONTRACEPTIVES: Primary | ICD-10-CM

## 2021-08-03 RX ORDER — NORETHINDRONE ACETATE AND ETHINYL ESTRADIOL 1.5-30(21)
1 KIT ORAL DAILY
Qty: 84 TABLET | Refills: 0 | Status: SHIPPED | OUTPATIENT
Start: 2021-08-03 | End: 2021-11-05

## 2021-08-03 NOTE — TELEPHONE ENCOUNTER
pt informed of ocp change to Junel FE 1 5/30 - she will recall to update in 2 months  Will complete present pill pack

## 2021-08-03 NOTE — TELEPHONE ENCOUNTER
----- Message from Manjit Chamberlain sent at 8/3/2021 11:37 AM EDT -----  Regarding: Prescription Question  Contact: [phone]  Hello,    Would we be able to change the dosage of my birth control prescription? I just started spotting in the middle of my current pack  I would like to be regular in my cycle and not spot randomly       Thank you,  Jordin Vallejo

## 2021-08-03 NOTE — TELEPHONE ENCOUNTER
Pt was changed from [address] to [address] 1[address]20 in 12/2020 - 1st 3 months w/out BTB, then was getting menses q other month, no menses in 6/2021, light x 1 day in 7/2021, now 2nd wk active pills & started with BTB (light flow)    Do you want to change to Junel FE 1 5/30? - [facility]  3 months x 1 rf

## 2021-08-03 NOTE — TELEPHONE ENCOUNTER
Yes lets increase dose and have her follow-up in 2 months  Let her know I sent new OCP to her pharmacy on [address]

## 2021-08-17 ENCOUNTER — TELEMEDICINE (OUTPATIENT)
Dept: FAMILY MEDICINE CLINIC | Facility: CLINIC | Age: 26
End: 2021-08-17
Payer: COMMERCIAL

## 2021-08-17 DIAGNOSIS — B34.9 VIRAL INFECTION: Primary | ICD-10-CM

## 2021-08-17 DIAGNOSIS — R05.9 COUGH: ICD-10-CM

## 2021-08-17 PROCEDURE — 1036F TOBACCO NON-USER: CPT | Performed by: FAMILY MEDICINE

## 2021-08-17 PROCEDURE — 99212 OFFICE O/P EST SF 10 MIN: CPT | Performed by: FAMILY MEDICINE

## 2021-08-17 RX ORDER — BENZONATATE 100 MG/1
100 CAPSULE ORAL 3 TIMES DAILY PRN
Qty: 21 CAPSULE | Refills: 0 | Status: SHIPPED | OUTPATIENT
Start: 2021-08-17 | End: 2021-08-24

## 2021-08-17 NOTE — PROGRESS NOTES
Virtual Regular Visit    Verification of patient location:    Patient is located in the following state in which I hold an active license [place]      Assessment/Plan:    Problem List Items Addressed This Visit     None      Visit Diagnoses     Viral infection    -  Primary     likely viral infection  Advise patient to stay well hydrated and nourished  Rest for now  Tylenol for any fevers  Follow-up as needed    Relevant Medications    benzonatate (TESSALON PERLES) 100 mg capsule    Cough         will start Tessalon Perles 100 mg t i d  p r n  for cough    Relevant Medications    benzonatate (TESSALON PERLES) 100 mg capsule               Reason for visit is   Chief Complaint   Patient presents with    Sore Throat    Virtual Regular Visit        Encounter provider Mert Amos DO    Provider located at [address] [address]      Recent Visits  No visits were found meeting these conditions  Showing recent visits within past 7 days and meeting all other requirements  Today's Visits  Date Type Provider Dept   08/17/21 Telemedicine Jennie Bazan 14 today's visits and meeting all other requirements  Future Appointments  No visits were found meeting these conditions  Showing future appointments within next 150 days and meeting all other requirements       The patient was identified by name and date of birth  [facility] was informed that this is a telemedicine visit and that the visit is being conducted through [address] [place] [address] Now and patient was informed that this is a secure, HIPAA-compliant platform  She agrees to proceed     My office door was closed  No one else was in the room  She acknowledged consent and understanding of privacy and security of the video platform  The patient has agreed to participate and understands they can discontinue the visit at any time      Patient is aware this is a billable service  Subjective  Faustino Hollis is a 31 y o  female Complaining of sore throat x 1 day  HPI     Patient complains of a sore throat and cough for past day  Had a cold sore that appeared on lipid yesterday  Patient took some famciclovir and cold sore is largely improved today  Had symptoms of tactile fever yesterday and chills  Today improved  Denies any shortness of breath or wheezing  Denies nausea, vomiting, diarrhea, chest pain, palpitations  Denies any congestion  Patient wonders if this could be strep throat  Past Medical History:   Diagnosis Date    Contact lens overwear of both eyes     Herpes labialis     Kidney stone     Migraine     Motion sickness        Past Surgical History:   Procedure Laterality Date    CYST REMOVAL N/A     vaginal area    NH CORRJ HALLUX VALGUS W/SESMDC W/DIST METAR OSTEOT Right 3/22/2021    Procedure: Rk Espinosa;  Surgeon: Edith Spurling, DPM;  Location: [facility];  Service: Podiatry    RHINOPLASTY      RHINOPLASTY TIP Bilateral 5/18/2016    Procedure: RHINOPLASTY SMR, BILAT TURBINECTOMY ;  Surgeon: Franklin Varma MD;  Location: [facility];  Service:     TONSILLECTOMY      TONSILLECTOMY      WISDOM TOOTH EXTRACTION         Current Outpatient Medications   Medication Sig Dispense Refill    benzonatate (TESSALON PERLES) 100 mg capsule Take 1 capsule (100 mg total) by mouth 3 (three) times a day as needed for cough for up to 7 days 21 capsule 0    [facility] Acetaminophen [facility] 500 MG tablet TAKE 2 CAPS (1,000 MG TOTAL) BY MOUTH 2 (TWO) TIMES A DAY AS NEEDED (MIGRAINE)   norethindrone-ethinyl estradiol-iron (MICROGESTIN FE1 5/30) 1 5-30 MG-MCG tablet Take 1 tablet by mouth daily 84 tablet 0    rizatriptan (MAXALT) 10 MG tablet Take 10 mg by mouth as needed        No current facility-administered medications for this visit  No Known Allergies    Review of Systems   All other systems reviewed and are negative        Video Exam  Unable to obtain vital signs As the patient did not have any equipment  There were no vitals filed for this visit  Physical Exam  Nursing note reviewed  HENT:      Head: Normocephalic and atraumatic  Comments:   Sinuses nontender to palpation  Per patient     Mouth/Throat:      Comments: Unable to see oropharynx due to video quality  Pulmonary:      Effort: Pulmonary effort is normal    Musculoskeletal:      Cervical back: Normal range of motion  Lymphadenopathy:      Cervical: Cervical adenopathy (  Patient stated that her right side of neck was mildly swollen ) present  Neurological:      Mental Status: She is alert  I spent 15 minutes directly with the patient during this visit    VIRTUAL VISIT 1432 Vandana Wilkinson verbally agrees to participate in [employer]  Pt is aware that [employer] could be limited without vital signs or the ability to perform a full hands-on physical exam  Brit Chamberlain understands she or the provider may request at any time to terminate the video visit and request the patient to seek care or treatment in person

## 2021-09-07 ENCOUNTER — TELEPHONE (OUTPATIENT)
Dept: OBGYN CLINIC | Facility: CLINIC | Age: 26
End: 2021-09-07

## 2021-09-07 NOTE — TELEPHONE ENCOUNTER
----- Message from Manjit Chamberlain sent at 9/7/2021  9:47 AM EDT -----  Regarding: Prescription Question  Contact: [phone]  Hello,    Can you please send over refills to my pharmacy for [address]? I took the higher dosage one this past month and am almost done with the pack      Thank you,  Roscoe Nichols

## 2021-09-07 NOTE — TELEPHONE ENCOUNTER
Pt has taken Junel 1 5/30 x 1 month - has 2 refills remaining on current presc from 8/2021 as she had only gotten 1 month supply  She will verify with pharm  Had BTB (spotting x 1-2 days last wk which was 3rd wk active pills)  She will recall office to update in 2 months  Has yearly sched for 12/1/2021

## 2021-11-05 DIAGNOSIS — Z30.41 SURVEILLANCE FOR BIRTH CONTROL, ORAL CONTRACEPTIVES: ICD-10-CM

## 2021-11-05 RX ORDER — NORETHINDRONE ACETATE AND ETHINYL ESTRADIOL AND FERROUS FUMARATE 1.5-30(21)
KIT ORAL
Qty: 28 TABLET | Refills: 0 | Status: SHIPPED | OUTPATIENT
Start: 2021-11-05 | End: 2021-12-01 | Stop reason: SDUPTHER

## 2021-11-16 ENCOUNTER — OFFICE VISIT (OUTPATIENT)
Dept: FAMILY MEDICINE CLINIC | Facility: CLINIC | Age: 26
End: 2021-11-16
Payer: COMMERCIAL

## 2021-11-16 VITALS
SYSTOLIC BLOOD PRESSURE: 110 MMHG | HEART RATE: 90 BPM | TEMPERATURE: 98.2 F | DIASTOLIC BLOOD PRESSURE: 78 MMHG | HEIGHT: 64 IN | OXYGEN SATURATION: 98 % | BODY MASS INDEX: 30.05 KG/M2 | RESPIRATION RATE: 14 BRPM | WEIGHT: 176 LBS

## 2021-11-16 DIAGNOSIS — N30.00 ACUTE CYSTITIS WITHOUT HEMATURIA: ICD-10-CM

## 2021-11-16 DIAGNOSIS — R30.0 DYSURIA: Primary | ICD-10-CM

## 2021-11-16 LAB
SL AMB  POCT GLUCOSE, UA: ABNORMAL
SL AMB LEUKOCYTE ESTERASE,UA: ABNORMAL
SL AMB POCT BILIRUBIN,UA: ABNORMAL
SL AMB POCT BLOOD,UA: ABNORMAL
SL AMB POCT CLARITY,UA: ABNORMAL
SL AMB POCT COLOR,UA: YELLOW
SL AMB POCT KETONES,UA: ABNORMAL
SL AMB POCT NITRITE,UA: ABNORMAL
SL AMB POCT PH,UA: 6
SL AMB POCT SPECIFIC GRAVITY,UA: 1.02
SL AMB POCT URINE PROTEIN: ABNORMAL
SL AMB POCT UROBILINOGEN: ABNORMAL

## 2021-11-16 PROCEDURE — 87086 URINE CULTURE/COLONY COUNT: CPT | Performed by: FAMILY MEDICINE

## 2021-11-16 PROCEDURE — 81002 URINALYSIS NONAUTO W/O SCOPE: CPT | Performed by: FAMILY MEDICINE

## 2021-11-16 PROCEDURE — 87186 SC STD MICRODIL/AGAR DIL: CPT | Performed by: FAMILY MEDICINE

## 2021-11-16 PROCEDURE — 3725F SCREEN DEPRESSION PERFORMED: CPT | Performed by: FAMILY MEDICINE

## 2021-11-16 PROCEDURE — 3008F BODY MASS INDEX DOCD: CPT | Performed by: FAMILY MEDICINE

## 2021-11-16 PROCEDURE — 99213 OFFICE O/P EST LOW 20 MIN: CPT | Performed by: FAMILY MEDICINE

## 2021-11-16 PROCEDURE — 87077 CULTURE AEROBIC IDENTIFY: CPT | Performed by: FAMILY MEDICINE

## 2021-11-16 PROCEDURE — 1036F TOBACCO NON-USER: CPT | Performed by: FAMILY MEDICINE

## 2021-11-16 RX ORDER — PHENAZOPYRIDINE HYDROCHLORIDE 100 MG/1
100 TABLET, FILM COATED ORAL 3 TIMES DAILY PRN
Qty: 6 TABLET | Refills: 0 | Status: SHIPPED | OUTPATIENT
Start: 2021-11-16 | End: 2021-12-01

## 2021-11-16 RX ORDER — SULFAMETHOXAZOLE AND TRIMETHOPRIM 800; 160 MG/1; MG/1
TABLET ORAL
Qty: 10 TABLET | Refills: 0 | Status: SHIPPED | OUTPATIENT
Start: 2021-11-16 | End: 2021-11-21

## 2021-11-18 LAB — BACTERIA UR CULT: ABNORMAL

## 2021-12-01 ENCOUNTER — ANNUAL EXAM (OUTPATIENT)
Dept: OBGYN CLINIC | Facility: CLINIC | Age: 26
End: 2021-12-01
Payer: COMMERCIAL

## 2021-12-01 VITALS
DIASTOLIC BLOOD PRESSURE: 70 MMHG | WEIGHT: 178.4 LBS | HEIGHT: 64 IN | SYSTOLIC BLOOD PRESSURE: 110 MMHG | BODY MASS INDEX: 30.46 KG/M2

## 2021-12-01 DIAGNOSIS — Z01.419 WOMEN'S ANNUAL ROUTINE GYNECOLOGICAL EXAMINATION: Primary | ICD-10-CM

## 2021-12-01 DIAGNOSIS — Z30.41 SURVEILLANCE FOR BIRTH CONTROL, ORAL CONTRACEPTIVES: ICD-10-CM

## 2021-12-01 PROBLEM — M20.11 HALLUX VALGUS (ACQUIRED), RIGHT FOOT: Status: RESOLVED | Noted: 2021-02-25 | Resolved: 2021-12-01

## 2021-12-01 PROBLEM — M21.611 BUNION OF GREAT TOE OF RIGHT FOOT: Status: RESOLVED | Noted: 2021-03-12 | Resolved: 2021-12-01

## 2021-12-01 PROCEDURE — 3008F BODY MASS INDEX DOCD: CPT | Performed by: FAMILY MEDICINE

## 2021-12-01 PROCEDURE — S0612 ANNUAL GYNECOLOGICAL EXAMINA: HCPCS | Performed by: NURSE PRACTITIONER

## 2021-12-01 RX ORDER — NORETHINDRONE ACETATE AND ETHINYL ESTRADIOL 1.5-30(21)
1 KIT ORAL DAILY
Qty: 84 TABLET | Refills: 3 | Status: SHIPPED | OUTPATIENT
Start: 2021-12-01

## 2022-01-05 ENCOUNTER — TELEMEDICINE (OUTPATIENT)
Dept: FAMILY MEDICINE CLINIC | Facility: CLINIC | Age: 27
End: 2022-01-05
Payer: COMMERCIAL

## 2022-01-05 DIAGNOSIS — R43.0 ANOSMIA: Primary | ICD-10-CM

## 2022-01-05 DIAGNOSIS — J01.00 ACUTE NON-RECURRENT MAXILLARY SINUSITIS: ICD-10-CM

## 2022-01-05 PROCEDURE — U0005 INFEC AGEN DETEC AMPLI PROBE: HCPCS | Performed by: FAMILY MEDICINE

## 2022-01-05 PROCEDURE — U0003 INFECTIOUS AGENT DETECTION BY NUCLEIC ACID (DNA OR RNA); SEVERE ACUTE RESPIRATORY SYNDROME CORONAVIRUS 2 (SARS-COV-2) (CORONAVIRUS DISEASE [COVID-19]), AMPLIFIED PROBE TECHNIQUE, MAKING USE OF HIGH THROUGHPUT TECHNOLOGIES AS DESCRIBED BY CMS-2020-01-R: HCPCS | Performed by: FAMILY MEDICINE

## 2022-01-05 PROCEDURE — 99213 OFFICE O/P EST LOW 20 MIN: CPT | Performed by: FAMILY MEDICINE

## 2022-01-05 NOTE — PROGRESS NOTES
COVID-19 Outpatient Progress Note    Chief Complaint   Patient presents with    COVID-19     COVID-19 Negative home test  Recently treated for strep and completed antibiotics  Symptoms congestion, sinus pressure, loss of taste, and smell since yesterday  Health Maintenance   Topic Date Due    Hepatitis C Screening  Never done    COVID-19 Vaccine (1) Never done    HIV Screening  Never done    DTaP,Tdap,and Td Vaccines (6 - Td or Tdap) 12/27/2017    Influenza Vaccine (1) Never done    Annual Physical  12/01/2021    BMI: Followup Plan  03/12/2022    Depression Screening  11/16/2022    BMI: Adult  12/01/2022    Cervical Cancer Screening  12/09/2022    HIB Vaccine  Completed    Hepatitis B Vaccine  Completed    IPV Vaccine  Completed    Pneumococcal Vaccine: Pediatrics (0 to 5 Years) and At-Risk Patients (6 to 59 Years)  Aged Out    Hepatitis A Vaccine  Aged Out    Meningococcal ACWY Vaccine  Aged Out    HPV Vaccine  Aged Out          Assessment/Plan:    Problem List Items Addressed This Visit        Respiratory    Acute maxillary sinusitis     Patient had virtual visit with [employer] on Sunday, January 2, 2022  She was prescribed Amoxil for 7 days  Recommended to complete antibiotic therapy  Other    Anosmia - Primary     Patient reports loss of taste and smell since yesterday  C/o nasal congestion, sinus pressure  Reports no cough  No fever  Patient had negative at home Covid test on 1/1/2022  Will refer to [address] [place] [address] to get tested for COVID-19 by PCR  Recommended home isolation until test results are available  Encouraged to increase fluid intake  Take DayQuil, NyQuil for congestion, Tylenol PRN for headache  Relevant Orders    COVID Only- Collected at   [facility] or [facility]         Disposition:     I have spent 16 minutes directly with the patient   Greater than 50% of this time was spent in counseling/coordination of care regarding: diagnostic results, prognosis, risks and benefits of treatment options, instructions for management, patient and family education, importance of treatment compliance, risk factor reductions and impressions  Encounter provider Steffanie Deutsch MD    Provider located at [address] [facility]  [address] [place] [id number]    Recent Visits  No visits were found meeting these conditions  Showing recent visits within past 7 days and meeting all other requirements  Today's Visits  Date Type Provider Dept   01/05/22 Telemedicine Steffanie Deutsch MD [address] today's visits and meeting all other requirements  Future Appointments  No visits were found meeting these conditions  Showing future appointments within next 150 days and meeting all other requirements     This virtual check-in was done via [facility] and patient was informed that this is a secure, HIPAA-compliant platform  She agrees to proceed  Patient agrees to participate in a virtual check in via telephone or video visit instead of presenting to the office to address urgent/immediate medical needs  Patient is aware this is a billable service  After connecting through [facility], the patient was identified by name and date of birth  Daisymo Gore was informed that this was a telemedicine visit and that the exam was being conducted confidentially over secure lines  My office door was closed  No one else was in the room  Daisy Bao acknowledged consent and understanding of privacy and security of the telemedicine visit  I informed the patient that I have reviewed her record in Epic and presented the opportunity for her to ask any questions regarding the visit today  The patient agreed to participate      Verification of patient location:  Patient is located in the following state in which I hold an active license: [place]    Subjective:   Jaya Vasquez is a 31 y o  female who has been screened for COVID-19  Patient's symptoms include fatigue, nasal congestion, sore throat (improved), anosmia, loss of taste and headache  Patient denies fever, chills, cough, shortness of breath, chest tightness, nausea, vomiting and diarrhea  COVID-19 vaccination status: Not vaccinated    Yuliya Cuellar has been staying home and has isolated themselves in her home  She is taking care to not share personal items and is cleaning all surfaces that are touched often, like counters, tabletops, and doorknobs using household cleaning sprays or wipes  Lab Results   Component Value Date    Ragini Emigdio Test 09/21/2021     Past Medical History:   Diagnosis Date    Contact lens overwear of both eyes     Herpes labialis     Kidney stone     Migraine     Motion sickness      Past Surgical History:   Procedure Laterality Date    CYST REMOVAL N/A     vaginal area    DE CORRJ HALLUX VALGUS W/SESMDC W/DIST METAR OSTEOT Right 3/22/2021    Procedure: Whitney Carpenter;  Surgeon: Gearld Gilford, DPM;  Location: [facility];  Service: Podiatry    RHINOPLASTY      RHINOPLASTY TIP Bilateral 5/18/2016    Procedure: RHINOPLASTY SMR, BILAT TURBINECTOMY ;  Surgeon: Enedina Brewster MD;  Location: [facility];  Service:     TONSILLECTOMY      TONSILLECTOMY      WISDOM TOOTH EXTRACTION       Current Outpatient Medications   Medication Sig Dispense Refill    [facility] Acetaminophen Ex St 500 MG tablet TAKE 2 CAPS (1,000 MG TOTAL) BY MOUTH 2 (TWO) TIMES A DAY AS NEEDED (MIGRAINE)   famciclovir (FAMVIR) 500 mg tablet Take 3 tablets for 1 day for cold sores outbreak 21 tablet 0    norethindrone-ethinyl estradiol-iron (Junel FE 1 5/30) 1 5-30 MG-MCG tablet Take 1 tablet by mouth daily 84 tablet 3    rizatriptan (MAXALT) 10 MG tablet Take 10 mg by mouth as needed        No current facility-administered medications for this visit       No Known Allergies    Review of Systems   Constitutional: Positive for fatigue  Negative for activity change, appetite change, chills and fever  HENT: Positive for congestion and sore throat (improved)  Respiratory: Negative for cough, chest tightness and shortness of breath  Cardiovascular: Negative for chest pain  Gastrointestinal: Negative for diarrhea, nausea and vomiting  Neurological: Positive for headaches  Negative for dizziness  Objective: There were no vitals filed for this visit  Physical Exam  Vitals and nursing note reviewed  Constitutional:       Appearance: Normal appearance  She is not toxic-appearing  HENT:      Nose: Congestion present  Eyes:      Conjunctiva/sclera: Conjunctivae normal       Pupils: Pupils are equal, round, and reactive to light  Pulmonary:      Effort: Pulmonary effort is normal    Musculoskeletal:      Cervical back: Normal range of motion and neck supple  Skin:     Findings: No rash  Neurological:      Mental Status: She is alert  Psychiatric:         Mood and Affect: Mood normal          VIRTUAL VISIT DISCLAIMER    Brit Chamberlain verbally agrees to participate in [employer]  Pt is aware that [employer] could be limited without vital signs or the ability to perform a full hands-on physical exam  Brit Chamberlain understands she or the provider may request at any time to terminate the video visit and request the patient to seek care or treatment in person

## 2022-01-05 NOTE — ASSESSMENT & PLAN NOTE
Patient had virtual visit with [facility] on Sunday, January 2, 2022  She was prescribed Amoxil for 7 days  Recommended to complete antibiotic therapy

## 2022-01-05 NOTE — ASSESSMENT & PLAN NOTE
Patient reports loss of taste and smell since yesterday  C/o nasal congestion, sinus pressure  Reports no cough  No fever  Patient had negative at home Covid test on 1/1/2022  Will refer to [address] [place] [address] to get tested for COVID-19 by PCR  Recommended home isolation until test results are available  Encouraged to increase fluid intake  Take DayQuil, NyQuil for congestion, Tylenol PRN for headache

## 2022-01-07 ENCOUNTER — TELEPHONE (OUTPATIENT)
Dept: FAMILY MEDICINE CLINIC | Facility: CLINIC | Age: 27
End: 2022-01-07

## 2022-01-07 NOTE — TELEPHONE ENCOUNTER
Patient is unvaccinated  Tested positive for COVID at home today  Patient did have negative test up until 1/5  Given this information, advised patient to quarantine at home for at least 10 days since symptoms are persistent  Patient may return to work after that  Last date quarantine would be 01/17/2021

## 2022-01-07 NOTE — TELEPHONE ENCOUNTER
Patient has tested + today for Covid and has some nasal congestion and loss of taste and smell -does not feel she needs a follow-up appointment next week  How long should she quarantine? She would like a call back today to [phone]

## 2022-01-07 NOTE — TELEPHONE ENCOUNTER
Patient was advised to quarantine as mentioned by provider   Patient understood information and stated that she would not be following that

## 2022-02-02 DIAGNOSIS — B00.1 RECURRENT HERPES LABIALIS: ICD-10-CM

## 2022-02-02 RX ORDER — FAMCICLOVIR 500 MG/1
TABLET, FILM COATED ORAL
Qty: 3 TABLET | Refills: 2 | Status: SHIPPED | OUTPATIENT
Start: 2022-02-02 | End: 2022-02-03

## 2022-03-28 ENCOUNTER — VBI (OUTPATIENT)
Dept: ADMINISTRATIVE | Facility: OTHER | Age: 27
End: 2022-03-28

## 2022-07-26 ENCOUNTER — VBI (OUTPATIENT)
Dept: ADMINISTRATIVE | Facility: OTHER | Age: 27
End: 2022-07-26

## 2022-08-16 DIAGNOSIS — B00.1 RECURRENT HERPES LABIALIS: ICD-10-CM

## 2022-08-16 RX ORDER — FAMCICLOVIR 500 MG/1
TABLET, FILM COATED ORAL
Qty: 30 TABLET | Refills: 1 | Status: SHIPPED | OUTPATIENT
Start: 2022-08-16 | End: 2022-10-18

## 2022-09-29 DIAGNOSIS — Z30.41 SURVEILLANCE FOR BIRTH CONTROL, ORAL CONTRACEPTIVES: ICD-10-CM

## 2022-09-29 RX ORDER — NORETHINDRONE ACETATE AND ETHINYL ESTRADIOL AND FERROUS FUMARATE 1.5-30(21)
KIT ORAL
Qty: 84 TABLET | Refills: 3 | Status: SHIPPED | OUTPATIENT
Start: 2022-09-29

## 2022-10-12 PROBLEM — N30.00 ACUTE CYSTITIS WITHOUT HEMATURIA: Status: RESOLVED | Noted: 2021-11-16 | Resolved: 2022-10-12

## 2022-10-12 PROBLEM — J01.00 ACUTE MAXILLARY SINUSITIS: Status: RESOLVED | Noted: 2022-01-05 | Resolved: 2022-10-12

## 2022-10-18 ENCOUNTER — OFFICE VISIT (OUTPATIENT)
Dept: OBGYN CLINIC | Facility: CLINIC | Age: 27
End: 2022-10-18
Payer: COMMERCIAL

## 2022-10-18 VITALS
SYSTOLIC BLOOD PRESSURE: 114 MMHG | HEIGHT: 64 IN | BODY MASS INDEX: 29.71 KG/M2 | DIASTOLIC BLOOD PRESSURE: 70 MMHG | WEIGHT: 174 LBS

## 2022-10-18 DIAGNOSIS — N76.0 ACUTE VAGINITIS: Primary | ICD-10-CM

## 2022-10-18 PROCEDURE — 99213 OFFICE O/P EST LOW 20 MIN: CPT | Performed by: NURSE PRACTITIONER

## 2022-10-18 NOTE — PROGRESS NOTES
SUBJECTIVE:     28 y o  female complains of vaginal odor like an "onion" for about 2 to 3 weeks  Complains of mild external irritation  Comes and goes  Notices it more when she is sweating  She exercises in a hot room of 115 degrees  No vaginal discharge  Denies abnormal vaginal bleeding or significant pelvic pain or  fever  No UTI symptoms  Denies history of known exposure to STD  Patient's last menstrual period was 08/15/2022 (approximate)  OBJECTIVE:     She appears well, afebrile  Abdomen: benign, soft, nontender, no masses  Pelvic Exam: VULVA: normal appearing vulva with no masses, tenderness or lesions, VAGINA: scant white vaginal discharge no odor, CERVIX: normal appearing cervix without discharge or lesions  ASSESSMENT AND PLAN:     Alycia Lozano was seen today for vaginal swelling  Diagnoses and all orders for this visit:    Acute vaginitis      Benign exam findings  Culture obtained  Results will be released to [facility], if abnormal will call to review and discuss treatment plan

## 2022-10-19 LAB
BV BACTERIA RRNA VAG QL NAA+PROBE: NEGATIVE
C GLABRATA RNA VAG QL NAA+PROBE: NOT DETECTED
CANDIDA RRNA VAG QL PROBE: DETECTED
T VAGINALIS RRNA SPEC QL NAA+PROBE: NOT DETECTED

## 2022-10-20 DIAGNOSIS — B37.31 VAGINAL CANDIDA: Primary | ICD-10-CM

## 2022-10-20 RX ORDER — FLUCONAZOLE 100 MG/1
100 TABLET ORAL
Qty: 2 TABLET | Refills: 0 | Status: SHIPPED | OUTPATIENT
Start: 2022-10-20 | End: 2022-10-24

## 2022-11-17 ENCOUNTER — VBI (OUTPATIENT)
Dept: ADMINISTRATIVE | Facility: OTHER | Age: 27
End: 2022-11-17

## 2022-11-17 NOTE — TELEPHONE ENCOUNTER
11/17/22 7:01 AM     [employer] SmartForm used to document caregap status      [facility]

## 2022-11-23 ENCOUNTER — TELEPHONE (OUTPATIENT)
Dept: OTHER | Facility: OTHER | Age: 27
End: 2022-11-23

## 2022-11-23 NOTE — TELEPHONE ENCOUNTER
Patient reports she took an at home COVID test today and it was negative  She would like a call back to advise if her appointment 11/25/22 can be in the office

## 2022-11-25 ENCOUNTER — OFFICE VISIT (OUTPATIENT)
Dept: FAMILY MEDICINE CLINIC | Facility: CLINIC | Age: 27
End: 2022-11-25

## 2022-11-25 VITALS
OXYGEN SATURATION: 97 % | HEIGHT: 64 IN | HEART RATE: 100 BPM | RESPIRATION RATE: 16 BRPM | BODY MASS INDEX: 29.94 KG/M2 | TEMPERATURE: 98.7 F | WEIGHT: 175.4 LBS

## 2022-11-25 DIAGNOSIS — B34.9 VIRAL ILLNESS: Primary | ICD-10-CM

## 2022-11-25 PROBLEM — R30.0 DYSURIA: Status: RESOLVED | Noted: 2021-11-16 | Resolved: 2022-11-25

## 2022-11-25 PROBLEM — R43.0 ANOSMIA: Status: RESOLVED | Noted: 2022-01-05 | Resolved: 2022-11-25

## 2022-11-25 NOTE — PROGRESS NOTES
[facility]    NAME: Ara Chamberlain  AGE: 28 y o  SEX: female  : 1995     DATE: 2022     Assessment and Plan:     Problem List Items Addressed This Visit        Other    Viral illness - Primary     The patient started with symptoms on  of body aches, chills and some pressure in her head  Taking ibuprofen and NyQuil  Yesterday she states that she feels she has swollen lymph nodes in her neck  Patient reports testing for COVID both  Wednesday and Thursday and was negative  Patient's max temp was 102°  Patient's exam was normal   I do not appreciate any enlarged lymph nodes in her neck  She is concerned regarding her uvula being enlarged however upon exam it looks to be of normal size to me  The patient was not tested for flu however she is on day 5 of symptoms and there is no benefit to testing for flu at this time  The patient will continue with over-the-counter medications for her symptoms  I do not see any reason to place her on an antibiotic at today's visit  No follow-ups on file  Chief Complaint:     Chief Complaint   Patient presents with   • Cold Like Symptoms        History of Present Illness:      nights, body aches, head pressure, chills, taking ibuprofen and nyquil  Yesterday, swollen lymph nodes  Tested Wednesday and Thursday  Temp Wednesday 102  Review of Systems:     Review of Systems   Constitutional: Positive for chills and fever  Negative for activity change and fatigue  HENT: Positive for congestion and sinus pressure  Negative for hearing loss, rhinorrhea, trouble swallowing and voice change  Eyes: Negative for photophobia, pain, discharge and visual disturbance  Respiratory: Positive for cough  Negative for chest tightness and shortness of breath  Cardiovascular: Negative for chest pain, palpitations and leg swelling     Gastrointestinal: Negative for abdominal pain, blood in stool, constipation, nausea and vomiting  Endocrine: Negative for cold intolerance and heat intolerance  Genitourinary: Negative for difficulty urinating, frequency, hematuria, urgency, vaginal bleeding and vaginal discharge  Musculoskeletal: Negative for arthralgias and myalgias  Skin: Negative  Neurological: Negative for dizziness, weakness, numbness and headaches  Psychiatric/Behavioral: Negative for decreased concentration  The patient is not nervous/anxious  Problem List:     Patient Active Problem List   Diagnosis   • Recurrent herpes labialis   • Preop general physical exam   • Migraine   • Obesity (BMI 30 0-34  9)   • Viral illness        Objective:     Pulse 100   Temp 98 7 °F (37 1 °C) (Tympanic)   Resp 16   Ht 5' 4" (1 626 m)   Wt 79 6 kg (175 lb 6 4 oz)   SpO2 97%   BMI 30 11 kg/m²     Current Outpatient Medications   Medication Sig Dispense Refill   • Junel FE 1 5/30 1 5-30 MG-MCG tablet TAKE 1 TABLET BY MOUTH EVERY DAY 84 tablet 3   • famciclovir (FAMVIR) 500 mg tablet Take 3 tab  daily for cold sores outbreak 30 tablet 1     No current facility-administered medications for this visit  Physical Exam  Vitals reviewed  Constitutional:       Appearance: Normal appearance  She is obese  HENT:      Head: Normocephalic  Right Ear: Tympanic membrane, ear canal and external ear normal  There is no impacted cerumen  Left Ear: Tympanic membrane, ear canal and external ear normal  There is no impacted cerumen  Nose: Congestion and rhinorrhea present  Mouth/Throat:      Mouth: Mucous membranes are moist       Pharynx: Oropharynx is clear  No oropharyngeal exudate or posterior oropharyngeal erythema  Eyes:      Extraocular Movements: Extraocular movements intact  Pupils: Pupils are equal, round, and reactive to light  Cardiovascular:      Rate and Rhythm: Normal rate and regular rhythm  Pulses: Normal pulses     Pulmonary:      Effort: Pulmonary effort is normal       Breath sounds: Normal breath sounds  Musculoskeletal:         General: Normal range of motion  Skin:     General: Skin is warm and dry  Neurological:      General: No focal deficit present  Mental Status: She is alert and oriented to person, place, and time  Psychiatric:         Mood and Affect: Mood normal          Behavior: Behavior normal          Thought Content:  Thought content normal          Judgment: Judgment normal          Priyanka Burgos, [address] [address] [facility]

## 2022-11-25 NOTE — ASSESSMENT & PLAN NOTE
The patient started with symptoms on Sunday of body aches, chills and some pressure in her head  Taking ibuprofen and NyQuil  Yesterday she states that she feels she has swollen lymph nodes in her neck  Patient reports testing for COVID both  Wednesday and Thursday and was negative  Patient's max temp was 102°  Patient's exam was normal   I do not appreciate any enlarged lymph nodes in her neck  She is concerned regarding her uvula being enlarged however upon exam it looks to be of normal size to me  The patient was not tested for flu however she is on day 5 of symptoms and there is no benefit to testing for flu at this time  The patient will continue with over-the-counter medications for her symptoms  I do not see any reason to place her on an antibiotic at today's visit

## 2022-12-20 ENCOUNTER — ANNUAL EXAM (OUTPATIENT)
Dept: OBGYN CLINIC | Facility: CLINIC | Age: 27
End: 2022-12-20

## 2022-12-20 VITALS
HEIGHT: 64 IN | WEIGHT: 177 LBS | BODY MASS INDEX: 30.22 KG/M2 | DIASTOLIC BLOOD PRESSURE: 80 MMHG | SYSTOLIC BLOOD PRESSURE: 112 MMHG

## 2022-12-20 DIAGNOSIS — Z01.419 WOMEN'S ANNUAL ROUTINE GYNECOLOGICAL EXAMINATION: Primary | ICD-10-CM

## 2022-12-20 PROBLEM — B34.9 VIRAL ILLNESS: Status: RESOLVED | Noted: 2022-11-25 | Resolved: 2022-12-20

## 2022-12-20 PROBLEM — Z01.818 PREOP GENERAL PHYSICAL EXAM: Status: RESOLVED | Noted: 2021-03-12 | Resolved: 2022-12-20

## 2022-12-20 PROBLEM — E66.811 OBESITY (BMI 30.0-34.9): Status: RESOLVED | Noted: 2021-03-22 | Resolved: 2022-12-20

## 2022-12-20 PROBLEM — E66.9 OBESITY (BMI 30.0-34.9): Status: RESOLVED | Noted: 2021-03-22 | Resolved: 2022-12-20

## 2022-12-20 NOTE — PROGRESS NOTES
Subjective    HPI:     Tiffanie Mcdaniel is a 28 y o  nulliparous female  Her menstrual cycles are light  Her current method of contraception includes OCP  She denies issues with intimacy  She denies /GI and Gyn complaints  She feels safe at home  She denies depression/anxiety  Medical, surgical and family history reviewed  Her dental care is up-to-date  She eats a healthy diet and exercises regularly  She is happy with her weight  Gynecologic History    No LMP recorded  (Menstrual status: Birth Control)  Gardasil Vaccine Series: completed  Last Pap: 19  Results were: normal    Obstetric History    OB History    Para Term  AB Living   0 0 0 0 0 0   SAB IAB Ectopic Multiple Live Births   0 0 0 0 0       The following portions of the patient's history were reviewed and updated as appropriate: allergies, current medications, past family history, past medical history, past social history, past surgical history and problem list     Review of Systems    Pertinent items are noted in HPI  Objective    Physical Exam  Constitutional:       Appearance: Normal appearance  She is well-developed  Genitourinary:      Vulva, bladder and urethral meatus normal       No lesions in the vagina  Right Labia: No rash, tenderness, lesions, skin changes or Bartholin's cyst      Left Labia: No tenderness, lesions, skin changes, Bartholin's cyst or rash  No labial fusion noted  No inguinal adenopathy present in the right or left side  No vaginal discharge, erythema, tenderness, bleeding or granulation tissue  No vaginal prolapse present  No vaginal atrophy present  Right Adnexa: not tender, not full and no mass present  Left Adnexa: not tender, not full and no mass present  Cervix is nulliparous  No cervical motion tenderness, discharge, friability, lesion, polyp or nabothian cyst       Uterus is not enlarged, tender, irregular or prolapsed        No uterine mass detected  Uterus is anteverted  Pelvic exam was performed with patient in the lithotomy position  Breasts:     Breasts are symmetrical       Right: No inverted nipple, mass, nipple discharge, skin change or tenderness  Left: No inverted nipple, mass, nipple discharge, skin change or tenderness  HENT:      Head: Normocephalic and atraumatic  Neck:      Thyroid: No thyromegaly  Cardiovascular:      Rate and Rhythm: Normal rate and regular rhythm  Heart sounds: Normal heart sounds, S1 normal and S2 normal    Pulmonary:      Effort: Pulmonary effort is normal       Breath sounds: Normal breath sounds  Abdominal:      General: Bowel sounds are normal  There is no distension  Palpations: Abdomen is soft  There is no mass  Tenderness: There is no abdominal tenderness  There is no guarding  Hernia: There is no hernia in the left inguinal area or right inguinal area  Musculoskeletal:      Cervical back: Neck supple  Lymphadenopathy:      Cervical: No cervical adenopathy  Upper Body:      Right upper body: No supraclavicular or axillary adenopathy  Left upper body: No supraclavicular or axillary adenopathy  Lower Body: No right inguinal adenopathy  No left inguinal adenopathy  Neurological:      Mental Status: She is alert  Skin:     General: Skin is warm and dry  Findings: No rash  Psychiatric:         Attention and Perception: Attention and perception normal          Mood and Affect: Mood and affect normal          Speech: Speech normal          Behavior: Behavior is cooperative  Thought Content: Thought content normal          Cognition and Memory: Cognition and memory normal          Judgment: Judgment normal    Vitals and nursing note reviewed            Assessment and Plan    Una Freeman was seen today for gynecologic exam     Diagnoses and all orders for this visit:    Women's annual routine gynecological examination  -     Liquid-based pap, screening      Patient informed of a Stable GYN exam  A pap smear was performed  I have discussed the importance of exercise and healthy diet as well as adequate intake of calcium and vitamin D  The current ASCCP guidelines were reviewed  The low risk patient will receive pap smear screening every 3 years until the age of 34 and then every 3 to 5 years with HPV co-testing from the ages of 33-67  I emphasized the importance of an annual pelvic and breast exam  A yearly mammogram is recommended for breast cancer screening starting at age 36  Results will be released to [facility], if abnormal will call to review and discuss treatment plan  All questions have been answered to her satisfaction  Contraception: OCP (estrogen/progesterone)  Follow up in: 1 year or sooner if needed

## 2022-12-28 LAB
LAB AP GYN PRIMARY INTERPRETATION: NORMAL
LAB AP LMP: NORMAL
Lab: NORMAL

## 2023-01-03 DIAGNOSIS — B37.31 VAGINAL CANDIDA: Primary | ICD-10-CM

## 2023-01-03 RX ORDER — FLUCONAZOLE 100 MG/1
100 TABLET ORAL
Qty: 2 TABLET | Refills: 0 | Status: SHIPPED | OUTPATIENT
Start: 2023-01-03 | End: 2023-01-07

## 2023-01-14 ENCOUNTER — VBI (OUTPATIENT)
Dept: ADMINISTRATIVE | Facility: OTHER | Age: 28
End: 2023-01-14

## 2023-02-08 DIAGNOSIS — N76.0 ACUTE VAGINITIS: Primary | ICD-10-CM

## 2023-02-08 RX ORDER — FLUCONAZOLE 150 MG/1
150 TABLET ORAL DAILY
Qty: 2 TABLET | Refills: 0 | Status: SHIPPED | OUTPATIENT
Start: 2023-02-08 | End: 2023-02-09

## 2023-02-08 NOTE — TELEPHONE ENCOUNTER
Pt of QUYEN Duran - Pt having sx of white vag disch & itching - hx yeast vaginitis, Diflucan x 2 days effecetive    If agree, please sign off on presc for Diflucan to [facility] ([facility])

## 2023-02-08 NOTE — TELEPHONE ENCOUNTER
----- Message from Manjit Chamberlain sent at 2/8/2023  1:37 PM EST -----  Regarding: Prescription  Contact: [phone]  [place]  Can you please send over a prescription for fluconazole to treat a yeast infection I got yesterday?

## 2023-12-27 DIAGNOSIS — B00.1 RECURRENT HERPES LABIALIS: ICD-10-CM

## 2023-12-27 RX ORDER — FAMCICLOVIR 500 MG/1
TABLET ORAL
Qty: 30 TABLET | Refills: 1 | Status: SHIPPED | OUTPATIENT
Start: 2023-12-27 | End: 2024-02-28

## 2024-07-18 ENCOUNTER — NEW PATIENT (OUTPATIENT)
Dept: URBAN - METROPOLITAN AREA CLINIC 6 | Facility: CLINIC | Age: 29
End: 2024-07-18

## 2024-07-18 DIAGNOSIS — H16.8: ICD-10-CM

## 2024-07-18 PROCEDURE — 99203 OFFICE O/P NEW LOW 30 MIN: CPT

## 2024-07-18 ASSESSMENT — VISUAL ACUITY
OD_CC: 20/25-2
OS_CC: 20/20-2

## 2024-07-18 ASSESSMENT — TONOMETRY
OS_IOP_MMHG: 13
OD_IOP_MMHG: 15

## 2024-07-25 ENCOUNTER — FOLLOW UP (OUTPATIENT)
Dept: URBAN - METROPOLITAN AREA CLINIC 6 | Facility: CLINIC | Age: 29
End: 2024-07-25

## 2024-07-25 DIAGNOSIS — H16.8: ICD-10-CM

## 2024-07-25 PROCEDURE — 99213 OFFICE O/P EST LOW 20 MIN: CPT

## 2024-07-25 ASSESSMENT — VISUAL ACUITY
OD_CC: 20/25
OS_CC: 20/25+1

## 2024-07-25 ASSESSMENT — TONOMETRY
OS_IOP_MMHG: 16
OD_IOP_MMHG: 15

## 2024-08-07 ENCOUNTER — PROBLEM (OUTPATIENT)
Dept: URBAN - METROPOLITAN AREA CLINIC 6 | Facility: CLINIC | Age: 29
End: 2024-08-07

## 2024-08-07 DIAGNOSIS — H10.45: ICD-10-CM

## 2024-08-07 PROCEDURE — 92012 INTRM OPH EXAM EST PATIENT: CPT

## 2024-08-07 ASSESSMENT — TONOMETRY
OS_IOP_MMHG: 14
OD_IOP_MMHG: 14

## 2024-08-07 ASSESSMENT — VISUAL ACUITY
OD_CC: 20/25
OS_CC: 20/25

## 2024-08-08 ENCOUNTER — ANNUAL EXAM (OUTPATIENT)
Dept: OBGYN CLINIC | Facility: CLINIC | Age: 29
End: 2024-08-08
Payer: COMMERCIAL

## 2024-08-08 VITALS — BODY MASS INDEX: 30.21 KG/M2 | DIASTOLIC BLOOD PRESSURE: 82 MMHG | SYSTOLIC BLOOD PRESSURE: 106 MMHG | WEIGHT: 176 LBS

## 2024-08-08 DIAGNOSIS — Z01.419 WOMEN'S ANNUAL ROUTINE GYNECOLOGICAL EXAMINATION: Primary | ICD-10-CM

## 2024-08-08 PROCEDURE — S0612 ANNUAL GYNECOLOGICAL EXAMINA: HCPCS | Performed by: NURSE PRACTITIONER

## 2024-08-08 NOTE — PROGRESS NOTES
Subjective    HPI:     Brit Chamberlain is a 29 y.o. nulliparous female. Her menstrual cycles are regular and predictable. Her current method of contraception includes condoms. She has been in a stable relationship for one year. She denies issues with intimacy. She denies /GI and Gyn complaints. She feels safe at home. She denies depression/anxiety.  Medical, surgical and family history reviewed. Her dental care is up-to-date. She eats a healthy diet and exercises regularly. She is happy with her weight.     Visit Vitals  /82 (BP Location: Left arm, Patient Position: Sitting, Cuff Size: Standard)   Wt 79.8 kg (176 lb)   LMP 2024 (Approximate)   BMI 30.21 kg/m²   OB Status Having periods   Smoking Status Never   BSA 1.85 m²       Gynecologic History    Patient's last menstrual period was 2024 (approximate).     Last Pap: 22. Results were: normal    Obstetric and Medical History    OB History    Para Term  AB Living   0 0 0 0 0 0   SAB IAB Ectopic Multiple Live Births   0 0 0 0 0       Past Medical History:   Diagnosis Date    Contact lens overwear of both eyes     Herpes labialis     Kidney stone     Migraine     Motion sickness        Past Surgical History:   Procedure Laterality Date    CYST REMOVAL N/A     vaginal area    AZ CORRJ HLX VLGS BNCTY SESMDC DSTL METAR OSTEOT Right 3/22/2021    Procedure: BUNIONECTOMY KIZZY;  Surgeon: Marely Blanchard DPM;  Location: [facility];  Service: Podiatry    RHINOPLASTY      RHINOPLASTY TIP Bilateral 2016    Procedure: RHINOPLASTY SMR, BILAT TURBINECTOMY ;  Surgeon: Emmanuel Catherine MD;  Location: [facility];  Service:     TONSILLECTOMY      TONSILLECTOMY      WISDOM TOOTH EXTRACTION         The following portions of the patient's history were reviewed and updated as appropriate: allergies, current medications, past family history, past medical history, past social history, past surgical history, and problem list.    Review of  Systems    Pertinent items are noted in HPI.      Objective    Physical Exam  Constitutional:       Appearance: Normal appearance. She is well-developed.   Genitourinary:      Vulva, bladder and urethral meatus normal.      No lesions in the vagina.      Right Labia: No rash, tenderness, lesions, skin changes or Bartholin's cyst.     Left Labia: No tenderness, lesions, skin changes, Bartholin's cyst or rash.     No labial fusion noted.      No inguinal adenopathy present in the right or left side.     No vaginal discharge, erythema, tenderness, bleeding or granulation tissue.      No vaginal prolapse present.     No vaginal atrophy present.       Right Adnexa: not tender, not full and no mass present.     Left Adnexa: not tender, not full and no mass present.     No cervical motion tenderness, discharge, friability, lesion, polyp or nabothian cyst.      Uterus is not enlarged, tender, irregular or prolapsed.      No uterine mass detected.     Uterus is anteverted.      Pelvic exam was performed with patient in the lithotomy position.   Breasts:     Breasts are symmetrical.      Right: No inverted nipple, mass, nipple discharge, skin change or tenderness.      Left: No inverted nipple, mass, nipple discharge, skin change or tenderness.   HENT:      Head: Normocephalic and atraumatic.   Neck:      Thyroid: No thyromegaly.   Cardiovascular:      Rate and Rhythm: Normal rate and regular rhythm.      Heart sounds: Normal heart sounds, S1 normal and S2 normal.   Pulmonary:      Effort: Pulmonary effort is normal.      Breath sounds: Normal breath sounds.   Abdominal:      General: Bowel sounds are normal. There is no distension.      Palpations: Abdomen is soft. There is no mass.      Tenderness: There is no abdominal tenderness. There is no guarding.      Hernia: There is no hernia in the left inguinal area or right inguinal area.   Musculoskeletal:      Cervical back: Neck supple.   Lymphadenopathy:      Cervical: No  cervical adenopathy.      Upper Body:      Right upper body: No supraclavicular or axillary adenopathy.      Left upper body: No supraclavicular or axillary adenopathy.      Lower Body: No right inguinal adenopathy. No left inguinal adenopathy.   Neurological:      Mental Status: She is alert.   Skin:     General: Skin is warm and dry.      Findings: No rash.   Psychiatric:         Attention and Perception: Attention and perception normal.         Mood and Affect: Mood and affect normal.         Speech: Speech normal.         Behavior: Behavior is cooperative.         Thought Content: Thought content normal.         Cognition and Memory: Cognition and memory normal.         Judgment: Judgment normal.   Vitals and nursing note reviewed.          Assessment and Plan    Diagnoses and all orders for this visit:    Women's annual routine gynecological examination      Patient informed of a Stable GYN exam. A pap smear was not performed due to a negative pap in 2022.     I have discussed the importance of exercise and healthy diet as well as adequate intake of calcium and vitamin D. The current ASCCP guidelines were reviewed. The low risk patient will receive pap smear screening every 3 years until the age of 29 and then every 3 to 5 years with HPV co-testing from the ages of 30-65. I emphasized the importance of an annual pelvic and breast exam. A yearly mammogram is recommended for breast cancer screening starting at age 40.       Results will be released to [facility], if abnormal will call to review and discuss treatment plan.     All questions have been answered to her satisfaction.       Contraception: condoms.  Follow up in: 1 year or sooner if needed.

## 2024-09-18 ENCOUNTER — CONSULTATION (OUTPATIENT)
Dept: URBAN - METROPOLITAN AREA CLINIC 6 | Facility: CLINIC | Age: 29
End: 2024-09-18

## 2024-09-18 DIAGNOSIS — H52.13: ICD-10-CM

## 2024-09-18 DIAGNOSIS — H10.45: ICD-10-CM

## 2024-09-18 PROCEDURE — 76514 ECHO EXAM OF EYE THICKNESS: CPT | Mod: NC

## 2024-09-18 PROCEDURE — 92134 CPTRZ OPH DX IMG PST SGM RTA: CPT | Mod: NC

## 2024-09-18 PROCEDURE — MISCLASIKCON MISCLASIKCON

## 2024-09-18 ASSESSMENT — TONOMETRY
OS_IOP_MMHG: 16
OD_IOP_MMHG: 16

## 2024-09-18 ASSESSMENT — VISUAL ACUITY
OS_CC: J1+
OS_CC: 20/25-1
OU_SC: J1+
OD_SC: CF 4FT
OD_CC: 20/25-2
OD_SC: J1+
OU_CC: J1+
OU_SC: CF 4FT
OS_SC: CF 4FT
OU_CC: 20/25-1
OS_SC: J1+
OD_CC: J1+

## 2024-09-18 ASSESSMENT — KERATOMETRY
OD_K2POWER_DIOPTERS: 46.75
OD_K1POWER_DIOPTERS: 46.00
OS_K2POWER_DIOPTERS: 46.25
OS_AXISANGLE_DEGREES: 029
OS_K1POWER_DIOPTERS: 45.75
OD_AXISANGLE_DEGREES: 141
OD_AXISANGLE2_DEGREES: 51
OS_AXISANGLE2_DEGREES: 119

## 2024-10-24 ENCOUNTER — 1 WEEK POST-OP (OUTPATIENT)
Dept: URBAN - METROPOLITAN AREA CLINIC 6 | Facility: CLINIC | Age: 29
End: 2024-10-24

## 2024-10-24 DIAGNOSIS — Z98.890: ICD-10-CM

## 2024-10-24 PROCEDURE — 99024 POSTOP FOLLOW-UP VISIT: CPT

## 2024-10-24 ASSESSMENT — KERATOMETRY
OD_AXISANGLE_DEGREES: 141
OD_K2POWER_DIOPTERS: 46.75
OD_K1POWER_DIOPTERS: 46.00
OS_AXISANGLE2_DEGREES: 119
OS_K2POWER_DIOPTERS: 46.25
OS_K1POWER_DIOPTERS: 45.75
OS_AXISANGLE_DEGREES: 029
OD_AXISANGLE2_DEGREES: 51

## 2024-10-24 ASSESSMENT — VISUAL ACUITY
OD_SC: 20/25-2
OU_SC: J1+
OS_SC: J1+
OS_SC: 20/25-1
OU_SC: 20/20-2
OD_SC: J1+

## 2024-10-24 ASSESSMENT — TONOMETRY
OS_IOP_MMHG: 10
OD_IOP_MMHG: 10

## 2025-03-05 ENCOUNTER — RA CDI HCC (OUTPATIENT)
Dept: OTHER | Facility: HOSPITAL | Age: 30
End: 2025-03-05

## 2025-03-06 ENCOUNTER — OFFICE VISIT (OUTPATIENT)
Dept: FAMILY MEDICINE CLINIC | Facility: CLINIC | Age: 30
End: 2025-03-06
Payer: COMMERCIAL

## 2025-03-06 VITALS
HEIGHT: 64 IN | TEMPERATURE: 98 F | WEIGHT: 176.1 LBS | HEART RATE: 72 BPM | BODY MASS INDEX: 30.07 KG/M2 | RESPIRATION RATE: 18 BRPM | SYSTOLIC BLOOD PRESSURE: 124 MMHG | OXYGEN SATURATION: 98 % | DIASTOLIC BLOOD PRESSURE: 78 MMHG

## 2025-03-06 DIAGNOSIS — E66.9 OBESITY (BMI 30-39.9): ICD-10-CM

## 2025-03-06 DIAGNOSIS — Z86.69 HISTORY OF MIGRAINE: ICD-10-CM

## 2025-03-06 DIAGNOSIS — Z11.4 SCREENING FOR HIV (HUMAN IMMUNODEFICIENCY VIRUS): ICD-10-CM

## 2025-03-06 DIAGNOSIS — Z00.00 ANNUAL PHYSICAL EXAM: Primary | ICD-10-CM

## 2025-03-06 DIAGNOSIS — B00.1 RECURRENT HERPES LABIALIS: ICD-10-CM

## 2025-03-06 DIAGNOSIS — Z11.59 NEED FOR HEPATITIS C SCREENING TEST: ICD-10-CM

## 2025-03-06 PROCEDURE — 99395 PREV VISIT EST AGE 18-39: CPT | Performed by: NURSE PRACTITIONER

## 2025-03-06 RX ORDER — FAMCICLOVIR 500 MG/1
TABLET ORAL
Qty: 30 TABLET | Refills: 2 | Status: SHIPPED | OUTPATIENT
Start: 2025-03-06 | End: 2025-05-08

## 2025-03-06 NOTE — ASSESSMENT & PLAN NOTE
Patient with a history of migraines.  She did have 1 ocular migraine in the past with some transient loss of vision.

## 2025-03-06 NOTE — ASSESSMENT & PLAN NOTE
BMI in the office today is 30.  The patient does go to the gym approximately 5 times per week.  She is watching her diet.  Continue with lifestyle modifications.

## 2025-03-06 NOTE — PROGRESS NOTES
Adult Annual Physical  Name: Brit Chamberlain      : 1995      MRN: [id number]  Encounter Provider: QUYEN Brian  Encounter Date: 3/6/2025   Encounter department: [facility]    Assessment & Plan  Recurrent herpes labialis  Patient with a history of cold sores.  She does keep Famvir on hand.  Orders:    famciclovir (FAMVIR) 500 mg tablet; Take 3 tab. daily for cold sores outbreak    Annual physical exam    Orders:    CBC and differential; Future    Basic metabolic panel; Future    Lipid Panel with Direct LDL reflex; Future    Need for hepatitis C screening test    Orders:    Hepatitis C Antibody; Future    Screening for HIV (human immunodeficiency virus)    Orders:    HIV 1/2 AG/AB w Reflex [facility] for 2 yr old and above; Future    History of migraine  Patient with a history of migraines.  She did have 1 ocular migraine in the past with some transient loss of vision.       Obesity (BMI 30-39.9)  BMI in the office today is 30.  The patient does go to the gym approximately 5 times per week.  She is watching her diet.  Continue with lifestyle modifications.         Immunizations and preventive care screenings were discussed with patient today. Appropriate education was printed on patient's after visit summary.    Counseling:  Alcohol/drug use: discussed moderation in alcohol intake, the recommendations for healthy alcohol use, and avoidance of illicit drug use.  Dental Health: discussed importance of regular tooth brushing, flossing, and dental visits.  Injury prevention: discussed safety/seat belts, safety helmets, smoke detectors, carbon monoxide detectors, and smoking near bedding or upholstery.  Sexual health: discussed sexually transmitted diseases, partner selection, use of condoms, avoidance of unintended pregnancy, and contraceptive alternatives.  Exercise: the importance of regular exercise/physical activity was discussed. Recommend exercise 3-5 times per week for at least 30  minutes.       Depression Screening and Follow-up Plan: Patient was screened for depression during today's encounter. They screened negative with a PHQ-2 score of 0.          History of Present Illness     Adult Annual Physical:  Patient presents for annual physical. Patient presents to the office today for annual physical.  Overall she feels well.  She has no new medical concerns.  She continues to follow with gynecology.  She declines COVID and flu vaccines in the office today.  Surveillance blood work ordered..     Diet and Physical Activity:  - Diet/Nutrition: well balanced diet, consuming 3-5 servings of fruits/vegetables daily and low carb diet.  - Exercise: 5-7 times a week on average, moderate cardiovascular exercise and vigorous cardiovascular exercise.    Depression Screening:  - PHQ-2 Score: 0    General Health:  - Sleep: 7-8 hours of sleep on average.  - Hearing: normal hearing bilateral ears.  - Vision: previous LASIK surgery and no vision problems.  - Dental: regular dental visits and brushes teeth twice daily.    /GYN Health:  - Follows with GYN: yes.   - Menopause: premenopausal.   - History of STDs: no    Review of Systems   Constitutional:  Negative for activity change, chills, fatigue and fever.   HENT:  Negative for congestion, ear pain, hearing loss, rhinorrhea, sore throat, trouble swallowing and voice change.    Eyes:  Negative for photophobia, pain, discharge and visual disturbance.   Respiratory:  Negative for cough, chest tightness and shortness of breath.    Cardiovascular:  Negative for chest pain, palpitations and leg swelling.   Gastrointestinal:  Negative for abdominal pain, blood in stool, constipation, nausea and vomiting.   Endocrine: Negative for cold intolerance and heat intolerance.   Genitourinary:  Negative for difficulty urinating, dysuria, frequency, hematuria, urgency, vaginal bleeding and vaginal discharge.   Musculoskeletal:  Negative for arthralgias, back pain and  "myalgias.   Skin: Negative.  Negative for color change and rash.   Neurological:  Negative for dizziness, seizures, syncope, weakness, numbness and headaches.   Psychiatric/Behavioral:  Negative for decreased concentration. The patient is not nervous/anxious.    All other systems reviewed and are negative.    Current Outpatient Medications on File Prior to Visit   Medication Sig Dispense Refill    [DISCONTINUED] famciclovir (FAMVIR) 500 mg tablet Take 3 tab. daily for cold sores outbreak 30 tablet 1     No current facility-administered medications on file prior to visit.      Social History     Tobacco Use    Smoking status: Never    Smokeless tobacco: Never   Vaping Use    Vaping status: Never Used   Substance and Sexual Activity    Alcohol use: Yes     Comment: 1 a month    Drug use: Never    Sexual activity: Yes     Partners: Male     Birth control/protection: Condom Male       Objective   Resp 18   Ht 5' 4\" (1.626 m)   Wt 79.9 kg (176 lb 1.6 oz)   BMI 30.23 kg/m²     Physical Exam  Vitals and nursing note reviewed.   Constitutional:       General: She is not in acute distress.     Appearance: Normal appearance. She is obese.   HENT:      Head: Normocephalic and atraumatic.      Right Ear: Tympanic membrane, ear canal and external ear normal. There is no impacted cerumen.      Left Ear: Tympanic membrane, ear canal and external ear normal. There is no impacted cerumen.      Nose: Nose normal.      Mouth/Throat:      Mouth: Mucous membranes are moist.      Pharynx: Oropharynx is clear. No posterior oropharyngeal erythema.   Eyes:      General:         Right eye: No discharge.         Left eye: No discharge.      Extraocular Movements: Extraocular movements intact.      Pupils: Pupils are equal, round, and reactive to light.   Cardiovascular:      Rate and Rhythm: Normal rate and regular rhythm.      Pulses: Normal pulses.      Heart sounds: Normal heart sounds. No murmur heard.  Pulmonary:      Effort: " Pulmonary effort is normal.      Breath sounds: Normal breath sounds.   Abdominal:      General: Bowel sounds are normal.      Palpations: Abdomen is soft.   Musculoskeletal:         General: Normal range of motion.      Cervical back: Normal range of motion.   Skin:     General: Skin is warm and dry.      Capillary Refill: Capillary refill takes less than 2 seconds.   Neurological:      General: No focal deficit present.      Mental Status: She is alert and oriented to person, place, and time. Mental status is at baseline.   Psychiatric:         Mood and Affect: Mood normal.         Behavior: Behavior normal.         Thought Content: Thought content normal.         Judgment: Judgment normal.     BMI Counseling: Body mass index is 30.23 kg/m². The BMI is above normal. Nutrition recommendations include 3-5 servings of fruits/vegetables daily and moderation in carbohydrate intake. Exercise recommendations include moderate aerobic physical activity for 150 minutes/week, exercising 3-5 times per week, and joining a gym.

## 2025-03-06 NOTE — ASSESSMENT & PLAN NOTE
Patient with a history of cold sores.  She does keep Famvir on hand.  Orders:    famciclovir (FAMVIR) 500 mg tablet; Take 3 tab. daily for cold sores outbreak

## 2025-03-20 ENCOUNTER — ESTABLISHED COMPREHENSIVE EXAM (OUTPATIENT)
Dept: URBAN - METROPOLITAN AREA CLINIC 6 | Facility: CLINIC | Age: 30
End: 2025-03-20

## 2025-03-20 DIAGNOSIS — Z98.890: ICD-10-CM

## 2025-03-20 DIAGNOSIS — H04.123: ICD-10-CM

## 2025-03-20 PROCEDURE — 99024 POSTOP FOLLOW-UP VISIT: CPT

## 2025-03-20 ASSESSMENT — KERATOMETRY
OD_K2POWER_DIOPTERS: 46.75
OS_AXISANGLE_DEGREES: 029
OD_K1POWER_DIOPTERS: 46.00
OD_AXISANGLE_DEGREES: 141
OD_AXISANGLE2_DEGREES: 51
OD_AXISANGLE_DEGREES: 159
OS_K1POWER_DIOPTERS: 40.50
OD_K2POWER_DIOPTERS: 41.75
OS_K2POWER_DIOPTERS: 41.00
OD_AXISANGLE2_DEGREES: 69
OS_K2POWER_DIOPTERS: 46.25
OD_K1POWER_DIOPTERS: 41.00
OS_AXISANGLE_DEGREES: 017
OS_AXISANGLE2_DEGREES: 119
OS_K1POWER_DIOPTERS: 45.75
OS_AXISANGLE2_DEGREES: 107

## 2025-03-20 ASSESSMENT — TONOMETRY
OD_IOP_MMHG: 9
OS_IOP_MMHG: 9

## 2025-03-20 ASSESSMENT — VISUAL ACUITY
OD_SC: 20/20
OS_SC: 20/20

## (undated) DEVICE — SYRINGE 10ML LL

## (undated) DEVICE — K-WIRE TROCAR POINT BOTH ENDS .045                                    X 4
Type: IMPLANTABLE DEVICE | Status: NON-FUNCTIONAL
Removed: 2021-03-22

## (undated) DEVICE — CURITY NON-ADHERENT STRIPS: Brand: CURITY

## (undated) DEVICE — PLUMEPEN PRO 10FT

## (undated) DEVICE — CHLORAPREP HI-LITE 26ML ORANGE

## (undated) DEVICE — KERLIX BANDAGE ROLL: Brand: KERLIX

## (undated) DEVICE — CAST PADDING 4 IN SYNTHETIC NON-STRL

## (undated) DEVICE — BETHLEHEM UNIVERSAL  MIONR EXT: Brand: CARDINAL HEALTH

## (undated) DEVICE — 2000CC GUARDIAN II: Brand: GUARDIAN

## (undated) DEVICE — NEEDLE 25G X 1 1/2

## (undated) DEVICE — 2.0MM DRILL BIT/MINI QC/65MM

## (undated) DEVICE — SUT PROLENE 4-0 PS-2 18 IN 8682H

## (undated) DEVICE — 10FR FRAZIER SUCTION HANDLE: Brand: CARDINAL HEALTH

## (undated) DEVICE — INTENDED FOR TISSUE SEPARATION, AND OTHER PROCEDURES THAT REQUIRE A SHARP SURGICAL BLADE TO PUNCTURE OR CUT.: Brand: BARD-PARKER ® CARBON RIB-BACK BLADES

## (undated) DEVICE — NEEDLE 18 G X 1 1/2

## (undated) DEVICE — ACE WRAP 4 IN UNSTERILE

## (undated) DEVICE — TUBING SUCTION 5MM X 12 FT

## (undated) DEVICE — SUT VICRYL 3-0 PS-2 27 IN J427H

## (undated) DEVICE — 1.5MM DRILL BIT W/DEPTH MARK MINI QC/96 MM

## (undated) DEVICE — BLADE SAGITTAL 25.6 X 9.5MM

## (undated) DEVICE — GLOVE SRG BIOGEL 6.5

## (undated) DEVICE — GLOVE INDICATOR PI UNDERGLOVE SZ 7 BLUE

## (undated) RX ORDER — PREDNISOLONE ACETATE 10 MG/ML: 1 SUSPENSION/ DROPS OPHTHALMIC

## (undated) RX ORDER — TOBRAMYCIN AND DEXAMETHASONE 1; 3 MG/ML; MG/ML: 1 SUSPENSION/ DROPS OPHTHALMIC